# Patient Record
Sex: FEMALE | Race: WHITE | NOT HISPANIC OR LATINO | Employment: OTHER | URBAN - METROPOLITAN AREA
[De-identification: names, ages, dates, MRNs, and addresses within clinical notes are randomized per-mention and may not be internally consistent; named-entity substitution may affect disease eponyms.]

---

## 2017-08-07 ENCOUNTER — HOSPITAL ENCOUNTER (OUTPATIENT)
Dept: MAMMOGRAPHY | Facility: CLINIC | Age: 62
Discharge: HOME | End: 2017-08-07
Attending: FAMILY MEDICINE | Admitting: FAMILY MEDICINE
Payer: COMMERCIAL

## 2017-08-07 DIAGNOSIS — Z12.31 VISIT FOR SCREENING MAMMOGRAM: ICD-10-CM

## 2017-08-07 PROCEDURE — G0202 SCR MAMMO BI INCL CAD: HCPCS

## 2018-09-06 ENCOUNTER — HOSPITAL ENCOUNTER (OUTPATIENT)
Dept: MAMMOGRAPHY | Facility: CLINIC | Age: 63
Discharge: HOME | End: 2018-09-06
Attending: FAMILY MEDICINE | Admitting: FAMILY MEDICINE
Payer: MEDICARE

## 2018-09-06 DIAGNOSIS — Z12.31 VISIT FOR SCREENING MAMMOGRAM: ICD-10-CM

## 2018-09-06 PROCEDURE — 77063 BREAST TOMOSYNTHESIS BI: CPT

## 2019-09-16 ENCOUNTER — HOSPITAL ENCOUNTER (OUTPATIENT)
Dept: MAMMOGRAPHY | Facility: CLINIC | Age: 64
Discharge: HOME | End: 2019-09-16
Attending: FAMILY MEDICINE | Admitting: FAMILY MEDICINE
Payer: COMMERCIAL

## 2019-09-16 DIAGNOSIS — Z12.31 VISIT FOR SCREENING MAMMOGRAM: ICD-10-CM

## 2019-09-16 PROCEDURE — 77067 SCR MAMMO BI INCL CAD: CPT

## 2020-01-16 ENCOUNTER — THERAPY VISIT (OUTPATIENT)
Dept: PHYSICAL THERAPY | Facility: CLINIC | Age: 65
End: 2020-01-16
Payer: COMMERCIAL

## 2020-01-16 DIAGNOSIS — M79.671 PAIN OF RIGHT HEEL: ICD-10-CM

## 2020-01-16 DIAGNOSIS — M25.561 RIGHT KNEE PAIN: ICD-10-CM

## 2020-01-16 PROCEDURE — 97140 MANUAL THERAPY 1/> REGIONS: CPT | Mod: GP | Performed by: PHYSICAL THERAPIST

## 2020-01-16 PROCEDURE — 97161 PT EVAL LOW COMPLEX 20 MIN: CPT | Mod: GP | Performed by: PHYSICAL THERAPIST

## 2020-01-16 PROCEDURE — 97110 THERAPEUTIC EXERCISES: CPT | Mod: GP | Performed by: PHYSICAL THERAPIST

## 2020-01-16 ASSESSMENT — ACTIVITIES OF DAILY LIVING (ADL)
WALK: ACTIVITY IS MINIMALLY DIFFICULT
STIFFNESS: I HAVE THE SYMPTOM BUT IT DOES NOT AFFECT MY ACTIVITY
SIT WITH YOUR KNEE BENT: ACTIVITY IS MINIMALLY DIFFICULT
PAIN: I HAVE THE SYMPTOM BUT IT DOES NOT AFFECT MY ACTIVITY
GO DOWN STAIRS: ACTIVITY IS SOMEWHAT DIFFICULT
KNEEL ON THE FRONT OF YOUR KNEE: ACTIVITY IS SOMEWHAT DIFFICULT
RISE FROM A CHAIR: ACTIVITY IS MINIMALLY DIFFICULT
GO UP STAIRS: ACTIVITY IS SOMEWHAT DIFFICULT
HOW_WOULD_YOU_RATE_THE_OVERALL_FUNCTION_OF_YOUR_KNEE_DURING_YOUR_USUAL_DAILY_ACTIVITIES?: NEARLY NORMAL
WEAKNESS: I HAVE THE SYMPTOM BUT IT DOES NOT AFFECT MY ACTIVITY
HOW_WOULD_YOU_RATE_THE_CURRENT_FUNCTION_OF_YOUR_KNEE_DURING_YOUR_USUAL_DAILY_ACTIVITIES_ON_A_SCALE_FROM_0_TO_100_WITH_100_BEING_YOUR_LEVEL_OF_KNEE_FUNCTION_PRIOR_TO_YOUR_INJURY_AND_0_BEING_THE_INABILITY_TO_PERFORM_ANY_OF_YOUR_USUAL_DAILY_ACTIVITIES?: 90
KNEE_ACTIVITY_OF_DAILY_LIVING_SUM: 51
AS_A_RESULT_OF_YOUR_KNEE_INJURY,_HOW_WOULD_YOU_RATE_YOUR_CURRENT_LEVEL_OF_DAILY_ACTIVITY?: NEARLY NORMAL
LIMPING: THE SYMPTOM AFFECTS MY ACTIVITY MODERATELY
RAW_SCORE: 51
STAND: ACTIVITY IS MINIMALLY DIFFICULT
KNEE_ACTIVITY_OF_DAILY_LIVING_SCORE: 72.86
SWELLING: THE SYMPTOM AFFECTS MY ACTIVITY SLIGHTLY
GIVING WAY, BUCKLING OR SHIFTING OF KNEE: I DO NOT HAVE THE SYMPTOM
SQUAT: ACTIVITY IS MINIMALLY DIFFICULT

## 2020-01-16 NOTE — PROGRESS NOTES
[facility] Initial Evaluation  Subjective:  The history is provided by the patient. No  was used.   Type of problem:  Right knee    This is a new condition   Problem details: Was sick with pneumonia since November so has had to be sedentary.  Had onset of knee pain on the right with walking down steps.   Saw MD on 1/6/2020 and received a cortisone injection.  This has helped.  Now has less pain with walking.  Also has plantar fasciitis.  This has been an issue in the past and also got worse when she was sick. Pain is only on the right side.  Was able to do yoga and light aerobics class this week but had some knee and foot pain.   Goal is to get back 2-3 times a week.   .   Patient reports pain:  Posterior.  Associated symptoms:  Edema, loss of motion/stiffness and loss of strength. Symptoms are exacerbated by kneeling, sitting, walking, descending stairs and ascending stairs Relieved by: cortisone.    Type of problem:  Right ankle   Condition occurred with:  Insidious onset. This is a recurrent condition    Site of Pain: plantar. Radiates to:  No radiation. Associated symptoms:  Loss of motion/stiffness. Symptoms are exacerbated by weight bearing and relieved by rest.                      Objective:    Gait:      Deviations:  Ankle:  Pronation incr L, pronation incr R and push off decr RGeneral Deviations:  Toe out L and toe out R          Ankle/Foot Evaluation  ROM:    AROM:    Dorsiflexion:  Left:   5  Right:   10          Great Toe Extension: Left:      Right: 30 due to previous surgery    Strength:                        Gastroc/Soleus:Left: 4+/5   Weak/pain free  Pain:  Right:  5/5  Strong/painful  Pain:++  LIGAMENT TESTING: not assessed              SPECIAL TESTS: not assessed    PALPATION: Palpation of ankle: and lateral calcaneal.    Right ankle tenderness present at:   plantar fascia and medial calcaneal  EDEMA: normal          MOBILITY TESTING: Mobility testing  ankle: previous bunion surgery on the right, left Total ankle arthroplasty.            First Ray Right: hypomobile  FUNCTIONAL TESTS:           Proprioception:  Stork Balance Test: Right: painful and difficult                                               Knee Evaluation:  ROM:      PROM    Hyperextension: Left: 2    Right:  1    Flexion: Left: 125    Right:  120 with mild end range pain      Strength:     Extension:  Left: 5-/5   Strong/pain free  Pain:      Right: 5-/5   Strong/painful  Pain:    Quad Set Left: Good    Pain:   Quad Set Right: Good    Pain:  Ligament Testing:  Not Assessed                Special Tests: Special test for knee: Pain with step, 4 inches.    Right knee positive for the following tests:  Asterisk Sign  Palpation:      Right knee tenderness present at:  Popliteal  Edema:  Edema of the knee: moderate suprapatellar and posterior knee on the right.    Mobility Testing:  Normal            Functional Testing:  not assessed                  General     ROS    Assessment/Plan:    Patient is a 64 year old female with right side knee and right side ankle complaints.    Patient has the following significant findings with corresponding treatment plan.                Diagnosis 1:  Right knee pain  Pain -  hot/cold therapy, manual therapy, self management, education and home program  Decreased ROM/flexibility - manual therapy, therapeutic exercise and home program  Decreased strength - therapeutic exercise, therapeutic activities and home program  Decreased function - therapeutic activities and home program  Diagnosis 2:  Right plantar pain   Pain -  hot/cold therapy, manual therapy, splint/taping/bracing/orthotics, self management, education and home program  Decreased ROM/flexibility - manual therapy, therapeutic exercise and home program  Decreased strength - therapeutic exercise, therapeutic activities and home program  Decreased function - therapeutic activities and home program    Therapy  Evaluation Codes:   1) History comprised of:   Personal factors that impact the plan of care:      Time since onset of symptoms.    Comorbidity factors that impact the plan of care are:      Overweight.     Medications impacting care: None.  2) Examination of Body Systems comprised of:   Body structures and functions that impact the plan of care:      Ankle and Knee.   Activity limitations that impact the plan of care are:      Sports, Squatting/kneeling, Stairs and Walking.  3) Clinical presentation characteristics are:   Stable/Uncomplicated.  4) Decision-Making    Low complexity using standardized patient assessment instrument and/or measureable assessment of functional outcome.  Cumulative Therapy Evaluation is: Low complexity.    Previous and current functional limitations:  (See Goal Flow Sheet for this information)    Short term and Long term goals: (See Goal Flow Sheet for this information)     Communication ability:  Patient appears to be able to clearly communicate and understand verbal and written communication and follow directions correctly.  Treatment Explanation - The following has been discussed with the patient:   RX ordered/plan of care  Anticipated outcomes  Possible risks and side effects  This patient would benefit from PT intervention to resume normal activities.   Rehab potential is good.    Frequency:  1 X week, once daily  Duration:  for 8 weeks  Discharge Plan:  Achieve all LTG.  Independent in home treatment program.  Reach maximal therapeutic benefit.    Please refer to the daily flowsheet for treatment today, total treatment time and time spent performing 1:1 timed codes.

## 2020-01-16 NOTE — LETTER
[facility] PHYSICAL [facility]  [address]  [place] [id number] [phone]    2020    Re: Elizabeth Cuellar   :   1955  MRN:  [id number]   REFERRING PHYSICIAN:   Adrianna Galan    [facility]IC [facility]    Date of Initial Evaluation:  2020    Visits:  Rxs Used: 1  Reason for Referral:     Right knee pain  Pain of right heel    EVALUATION SUMMARY    [facility] Initial Evaluation  Subjective:  The history is provided by the patient. No  was used.   Type of problem:  Right knee    This is a new condition   Problem details: Was sick with pneumonia since November so has had to be sedentary.  Had onset of knee pain on the right with walking down steps.   Saw MD on 2020 and received a cortisone injection.  This has helped.  Now has less pain with walking.  Also has plantar fasciitis.  This has been an issue in the past and also got worse when she was sick. Pain is only on the right side.  Was able to do yoga and light aerobics class this week but had some knee and foot pain.   Goal is to get back 2-3 times a week.   .   Patient reports pain:  Posterior.  Associated symptoms:  Edema, loss of motion/stiffness and loss of strength. Symptoms are exacerbated by kneeling, sitting, walking, descending stairs and ascending stairs Relieved by: cortisone.  Type of problem:  Right ankle   Condition occurred with:  Insidious onset. This is a recurrent condition    Site of Pain: plantar. Radiates to:  No radiation. Associated symptoms:  Loss of motion/stiffness. Symptoms are exacerbated by weight bearing and relieved by rest.  Pertinent medical history includes:  Overweight, menopausal and depression.      Current medications:  None.                 Barriers include:  None as reported by patient.  Red flags:  None as reported by patient.  Knee Activity of Daily Living Score: 72.86           Objective:  Gait:    Deviations:  Ankle:  Pronation incr L, pronation incr R and push off decr RGeneral Deviations:  Toe out L and toe out R  Ankle/Foot Evaluation  ROM:    AROM:    Dorsiflexion:  Left:   5  Right:   10  Great Toe Extension: Left:      Right: 30 due to previous surgery    Strength:    Gastroc/Soleus:Left: 4+/5   Weak/pain free  Pain:  Right:  5/5  Strong/painful  Pain:++  LIGAMENT TESTING: not assessed  SPECIAL TESTS: not assessed  PALPATION: Palpation of ankle: and lateral calcaneal.  Right ankle tenderness present at:   plantar fascia and medial calcaneal  EDEMA: normal  MOBILITY TESTING: Mobility testing ankle: previous bunion surgery on the right, left Total ankle arthroplasty.  First Ray Right: hypomobile  FUNCTIONAL TESTS:   Proprioception:  Stork Balance Test: Right: painful and difficult   Knee Evaluation:  ROM:    PROM  Hyperextension: Left: 2    Right:  1  Flexion: Left: 125    Right:  120 with mild end range pain  Strength:   Extension:  Left: 5-/5   Strong/pain free  Pain:      Right: 5-/5   Strong/painful  Pain:  Quad Set Left: Good    Pain:   Quad Set Right: Good    Pain:  Ligament Testing:  Not Assessed  Special Tests: Special test for knee: Pain with step, 4 inches.  Right knee positive for the following tests:  Asterisk Sign  Palpation:    Right knee tenderness present at:  Popliteal  Edema:  Edema of the knee: moderate suprapatellar and posterior knee on the right.  Mobility Testing:  Normal  Functional Testing:  not assessed  Assessment/Plan:    Patient is a 64 year old female with right side knee and right side ankle complaints.    Patient has the following significant findings with corresponding treatment plan.                Diagnosis 1:  Right knee pain  Pain -  hot/cold therapy, manual therapy, self management, education and home program  Decreased ROM/flexibility - manual therapy, therapeutic exercise and home program  Decreased strength - therapeutic exercise,  therapeutic activities and home program  Decreased function - therapeutic activities and home program  Diagnosis 2:  Right plantar pain   Pain -  hot/cold therapy, manual therapy, splint/taping/bracing/orthotics, self management, education and home program  Decreased ROM/flexibility - manual therapy, therapeutic exercise and home program  Decreased strength - therapeutic exercise, therapeutic activities and home program  Decreased function - therapeutic activities and home program  Therapy Evaluation Codes:   1) History comprised of:   Personal factors that impact the plan of care:      Time since onset of symptoms.    Comorbidity factors that impact the plan of care are:      Overweight.     Medications impacting care: None.  2) Examination of Body Systems comprised of:   Body structures and functions that impact the plan of care:      Ankle and Knee.   Activity limitations that impact the plan of care are:      Sports, Squatting/kneeling, Stairs and Walking.  3) Clinical presentation characteristics are:   Stable/Uncomplicated.  4) Decision-Making    Low complexity using standardized patient assessment instrument and/or measureable assessment of functional outcome.  Cumulative Therapy Evaluation is: Low complexity.  Previous and current functional limitations:  (See Goal Flow Sheet for this information)    Short term and Long term goals: (See Goal Flow Sheet for this information)   Communication ability:  Patient appears to be able to clearly communicate and understand verbal and written communication and follow directions correctly.  Treatment Explanation - The following has been discussed with the patient:   RX ordered/plan of care  Anticipated outcomes  Possible risks and side effects  This patient would benefit from PT intervention to resume normal activities.   Rehab potential is good.    Frequency:  1 X week, once daily  Duration:  for 8 weeks  Discharge Plan:  Achieve all LTG.  Independent in home treatment  program.  Reach maximal therapeutic benefit.      Thank you for your referral.    INQUIRIES  Therapist: Adia Magana PT, [facility] - [facility] PHYSICAL THERAPY  [facility]  [place] [address]  Phone: [phone]  Fax: [phone]

## 2020-01-17 NOTE — PROGRESS NOTES
[facility] Initial Evaluation  Subjective:       Pertinent medical history includes:  Overweight, menopausal and depression.      Current medications:  None.                 Barriers include:  None as reported by patient.  Red flags:  None as reported by patient.         Knee Activity of Daily Living Score: 72.86            Objective:  System    Physical Exam    General     ROS    Assessment/Plan:

## 2020-01-23 ENCOUNTER — THERAPY VISIT (OUTPATIENT)
Dept: PHYSICAL THERAPY | Facility: CLINIC | Age: 65
End: 2020-01-23
Payer: COMMERCIAL

## 2020-01-23 DIAGNOSIS — M79.671 PAIN OF RIGHT HEEL: ICD-10-CM

## 2020-01-23 DIAGNOSIS — M25.561 RIGHT KNEE PAIN: ICD-10-CM

## 2020-01-23 PROCEDURE — 97140 MANUAL THERAPY 1/> REGIONS: CPT | Mod: GP | Performed by: PHYSICAL THERAPIST

## 2020-01-23 PROCEDURE — 97110 THERAPEUTIC EXERCISES: CPT | Mod: GP | Performed by: PHYSICAL THERAPIST

## 2020-01-29 ENCOUNTER — THERAPY VISIT (OUTPATIENT)
Dept: PHYSICAL THERAPY | Facility: CLINIC | Age: 65
End: 2020-01-29
Payer: COMMERCIAL

## 2020-01-29 DIAGNOSIS — M79.671 PAIN OF RIGHT HEEL: ICD-10-CM

## 2020-01-29 DIAGNOSIS — M25.561 RIGHT KNEE PAIN: ICD-10-CM

## 2020-01-29 PROCEDURE — 97110 THERAPEUTIC EXERCISES: CPT | Mod: GP | Performed by: PHYSICAL THERAPIST

## 2020-01-29 PROCEDURE — 97140 MANUAL THERAPY 1/> REGIONS: CPT | Mod: GP | Performed by: PHYSICAL THERAPIST

## 2020-02-06 ENCOUNTER — THERAPY VISIT (OUTPATIENT)
Dept: PHYSICAL THERAPY | Facility: CLINIC | Age: 65
End: 2020-02-06
Payer: COMMERCIAL

## 2020-02-06 DIAGNOSIS — M79.671 PAIN OF RIGHT HEEL: ICD-10-CM

## 2020-02-06 DIAGNOSIS — M25.561 RIGHT KNEE PAIN: ICD-10-CM

## 2020-02-06 PROCEDURE — 97140 MANUAL THERAPY 1/> REGIONS: CPT | Mod: GP | Performed by: PHYSICAL THERAPIST

## 2020-02-06 PROCEDURE — 97110 THERAPEUTIC EXERCISES: CPT | Mod: GP | Performed by: PHYSICAL THERAPIST

## 2020-02-06 ASSESSMENT — ACTIVITIES OF DAILY LIVING (ADL)
STIFFNESS: I DO NOT HAVE THE SYMPTOM
STAND: ACTIVITY IS NOT DIFFICULT
AS_A_RESULT_OF_YOUR_KNEE_INJURY,_HOW_WOULD_YOU_RATE_YOUR_CURRENT_LEVEL_OF_DAILY_ACTIVITY?: NORMAL
RISE FROM A CHAIR: ACTIVITY IS NOT DIFFICULT
SWELLING: I HAVE THE SYMPTOM BUT IT DOES NOT AFFECT MY ACTIVITY
PAIN: I DO NOT HAVE THE SYMPTOM
RAW_SCORE: 69
WEAKNESS: I DO NOT HAVE THE SYMPTOM
KNEE_ACTIVITY_OF_DAILY_LIVING_SUM: 69
GO DOWN STAIRS: ACTIVITY IS NOT DIFFICULT
HOW_WOULD_YOU_RATE_THE_CURRENT_FUNCTION_OF_YOUR_KNEE_DURING_YOUR_USUAL_DAILY_ACTIVITIES_ON_A_SCALE_FROM_0_TO_100_WITH_100_BEING_YOUR_LEVEL_OF_KNEE_FUNCTION_PRIOR_TO_YOUR_INJURY_AND_0_BEING_THE_INABILITY_TO_PERFORM_ANY_OF_YOUR_USUAL_DAILY_ACTIVITIES?: 95
KNEEL ON THE FRONT OF YOUR KNEE: ACTIVITY IS NOT DIFFICULT
GO UP STAIRS: ACTIVITY IS NOT DIFFICULT
LIMPING: I DO NOT HAVE THE SYMPTOM
SQUAT: ACTIVITY IS NOT DIFFICULT
KNEE_ACTIVITY_OF_DAILY_LIVING_SCORE: 98.57
WALK: ACTIVITY IS NOT DIFFICULT
GIVING WAY, BUCKLING OR SHIFTING OF KNEE: I DO NOT HAVE THE SYMPTOM
SIT WITH YOUR KNEE BENT: ACTIVITY IS NOT DIFFICULT
HOW_WOULD_YOU_RATE_THE_OVERALL_FUNCTION_OF_YOUR_KNEE_DURING_YOUR_USUAL_DAILY_ACTIVITIES?: NORMAL

## 2020-02-06 NOTE — LETTER
[facility] ATHLETIC [facility]  [address]  [place] [id number] [phone]    2020    Re: Elizabeth Cuellar   :   1955  MRN:  [id number]   REFERRING PHYSICIAN:   Adrianna Galan    [facility]    Date of Initial Evaluation:  2020  Visits:  Rxs Used: 4  Reason for Referral:     Right knee pain  Pain of right heel    Assessment/Plan:    DISCHARGE REPORT    Progress reporting period is from 2020 to 2020.       SUBJECTIVE  Subjective changes noted by patient:  .  Subjective: KNee is just fine.  Foot feels better but still an issue    Current pain level is NA  .     Previous pain level was   Initial Pain level: 5/10.   Changes in function:  Yes (See Goal flowsheet attached for changes in current functional level)  Adverse reaction to treatment or activity: None    OBJECTIVE  Changes noted in objective findings:  Yes,   Objective: No swelling and pain free ROM in the knee.  No knee pain with moving sit to stand.     ASSESSMENT/PLAN  Updated problem list and treatment plan: Diagnosis 1:  Knee pain  Pain -  manual therapy, self management and education  Decreased ROM/flexibility - manual therapy, therapeutic exercise and home program  Decreased strength - therapeutic exercise, therapeutic activities and home program  Inflammation - self management/home program  Diagnosis 2:  Foot pain   Pain -  hot/cold therapy, manual therapy, splint/taping/bracing/orthotics, self management, education and home program  Decreased ROM/flexibility - manual therapy, therapeutic exercise and home program  Decreased strength - therapeutic exercise, therapeutic activities and home program  Inflammation - self management/home program  Decreased function - therapeutic activities and home program  STG/LTGs have been met or progress has been made towards goals:  Yes (See Goal flow sheet completed today.)  Assessment of  Progress: The patient's condition is improving.  The patient's condition has potential to improve.  Self Management Plans:  Patient has been instructed in a home treatment program.  Elizabeth continues to require the following intervention to meet STG and LTG's:  Patient needs to continue to work on the home exercise program.      Recommendations:  This patient is ready to be discharged from therapy and continue their home treatment program.        Thank you for your referral.    INQUIRIES  Therapist:Adia Magana PT, [facility] PHYSICAL THERAPY  [address] [place] [address] [id number]  [facility] [address]  Phone: [phone]  Fax: [phone]

## 2020-02-07 ENCOUNTER — HOSPITAL ENCOUNTER (OUTPATIENT)
Facility: CLINIC | Age: 65
End: 2020-02-07
Attending: OBSTETRICS & GYNECOLOGY | Admitting: OBSTETRICS & GYNECOLOGY
Payer: COMMERCIAL

## 2020-02-07 RX ORDER — ACETAMINOPHEN 325 MG/1
975 TABLET ORAL ONCE
Status: CANCELLED | OUTPATIENT
Start: 2020-02-07 | End: 2020-02-07

## 2020-02-26 ENCOUNTER — HOSPITAL ENCOUNTER (OUTPATIENT)
Facility: CLINIC | Age: 65
End: 2020-02-26
Attending: OBSTETRICS & GYNECOLOGY | Admitting: OBSTETRICS & GYNECOLOGY
Payer: COMMERCIAL

## 2020-03-12 PROBLEM — M25.561 RIGHT KNEE PAIN: Status: RESOLVED | Noted: 2020-01-16 | Resolved: 2020-03-12

## 2020-03-12 PROBLEM — M79.671 PAIN OF RIGHT HEEL: Status: RESOLVED | Noted: 2020-01-16 | Resolved: 2020-03-12

## 2020-03-13 NOTE — PROGRESS NOTES
Subjective:  HPI  Physical Exam       Knee Activity of Daily Living Score: 98.57            Objective:  System    Physical Exam    General     ROS    Assessment/Plan:    DISCHARGE REPORT    Progress reporting period is from 1/16/2020 to 2/6/2020.       SUBJECTIVE  Subjective changes noted by patient:  .  Subjective: KNee is just fine.  Foot feels better but still an issue    Current pain level is NA  .     Previous pain level was   Initial Pain level: 5/10.   Changes in function:  Yes (See Goal flowsheet attached for changes in current functional level)  Adverse reaction to treatment or activity: None    OBJECTIVE  Changes noted in objective findings:  Yes,   Objective: No swelling and pain free ROM in the knee.  No knee pain with moving sit to stand.     ASSESSMENT/PLAN  Updated problem list and treatment plan: Diagnosis 1:  Knee pain  Pain -  manual therapy, self management and education  Decreased ROM/flexibility - manual therapy, therapeutic exercise and home program  Decreased strength - therapeutic exercise, therapeutic activities and home program  Inflammation - self management/home program  Diagnosis 2:  Foot pain   Pain -  hot/cold therapy, manual therapy, splint/taping/bracing/orthotics, self management, education and home program  Decreased ROM/flexibility - manual therapy, therapeutic exercise and home program  Decreased strength - therapeutic exercise, therapeutic activities and home program  Inflammation - self management/home program  Decreased function - therapeutic activities and home program  STG/LTGs have been met or progress has been made towards goals:  Yes (See Goal flow sheet completed today.)  Assessment of Progress: The patient's condition is improving.  The patient's condition has potential to improve.  Self Management Plans:  Patient has been instructed in a home treatment program.    Elizabeth continues to require the following intervention to meet STG and LTG's:  Patient needs to continue to  work on the home exercise program.      Recommendations:  This patient is ready to be discharged from therapy and continue their home treatment program.    Please refer to the daily flowsheet for treatment today, total treatment time and time spent performing 1:1 timed codes.

## 2020-05-18 DIAGNOSIS — Z11.59 ENCOUNTER FOR SCREENING FOR OTHER VIRAL DISEASES: Primary | ICD-10-CM

## 2020-05-26 DIAGNOSIS — Z11.59 ENCOUNTER FOR SCREENING FOR OTHER VIRAL DISEASES: ICD-10-CM

## 2020-05-26 PROCEDURE — 87635 SARS-COV-2 COVID-19 AMP PRB: CPT | Mod: 90 | Performed by: OBSTETRICS & GYNECOLOGY

## 2020-05-26 PROCEDURE — 99000 SPECIMEN HANDLING OFFICE-LAB: CPT | Performed by: OBSTETRICS & GYNECOLOGY

## 2020-05-27 PROBLEM — N95.0 POSTMENOPAUSAL BLEEDING: Status: ACTIVE | Noted: 2020-05-27

## 2020-05-27 LAB
SARS-COV-2 RNA SPEC QL NAA+PROBE: NOT DETECTED
SPECIMEN SOURCE: NORMAL

## 2020-05-27 RX ORDER — GLUCOSAM/CHONDRO/HERB 149/HYAL 750-100 MG
TABLET ORAL
COMMUNITY

## 2020-05-27 RX ORDER — LORATADINE 10 MG/1
10 TABLET ORAL DAILY PRN
COMMUNITY

## 2020-05-27 RX ORDER — CEPHALEXIN 500 MG/1
2000 CAPSULE ORAL
COMMUNITY

## 2020-05-27 RX ORDER — OXYBUTYNIN CHLORIDE 5 MG/1
5 TABLET, EXTENDED RELEASE ORAL DAILY
COMMUNITY
End: 2024-02-29

## 2020-05-27 RX ORDER — FLUTICASONE PROPIONATE 50 MCG
1 SPRAY, SUSPENSION (ML) NASAL DAILY
COMMUNITY

## 2020-05-27 RX ORDER — LEVOTHYROXINE SODIUM 125 UG/1
125 TABLET ORAL DAILY
COMMUNITY

## 2020-05-27 RX ORDER — ROSUVASTATIN CALCIUM 5 MG/1
5 TABLET, COATED ORAL DAILY
COMMUNITY

## 2020-05-27 RX ORDER — CYANOCOBALAMIN 1000 UG/ML
1 INJECTION, SOLUTION INTRAMUSCULAR; SUBCUTANEOUS
COMMUNITY

## 2020-05-28 ENCOUNTER — ANESTHESIA EVENT (OUTPATIENT)
Dept: SURGERY | Facility: CLINIC | Age: 65
End: 2020-05-28
Payer: COMMERCIAL

## 2020-05-28 ENCOUNTER — ANESTHESIA (OUTPATIENT)
Dept: SURGERY | Facility: CLINIC | Age: 65
End: 2020-05-28
Payer: COMMERCIAL

## 2020-05-28 ENCOUNTER — HOSPITAL ENCOUNTER (OUTPATIENT)
Facility: CLINIC | Age: 65
Discharge: HOME | End: 2020-05-28
Attending: OBSTETRICS & GYNECOLOGY | Admitting: OBSTETRICS & GYNECOLOGY
Payer: COMMERCIAL

## 2020-05-28 VITALS
DIASTOLIC BLOOD PRESSURE: 73 MMHG | WEIGHT: 181.6 LBS | SYSTOLIC BLOOD PRESSURE: 136 MMHG | TEMPERATURE: 97.2 F | OXYGEN SATURATION: 99 % | RESPIRATION RATE: 10 BRPM | HEART RATE: 50 BPM | HEIGHT: 66 IN | BODY MASS INDEX: 29.18 KG/M2

## 2020-05-28 DIAGNOSIS — N95.0 POSTMENOPAUSAL BLEEDING: Primary | ICD-10-CM

## 2020-05-28 LAB — HGB BLD-MCNC: 12 G/DL (ref 11.7–15.7)

## 2020-05-28 PROCEDURE — 88305 TISSUE EXAM BY PATHOLOGIST: CPT | Performed by: OBSTETRICS & GYNECOLOGY

## 2020-05-28 PROCEDURE — 25000125 ZZHC RX 250: Performed by: OBSTETRICS & GYNECOLOGY

## 2020-05-28 PROCEDURE — 85018 HEMOGLOBIN: CPT | Performed by: OBSTETRICS & GYNECOLOGY

## 2020-05-28 PROCEDURE — 40000170 ZZH STATISTIC PRE-PROCEDURE ASSESSMENT II: Performed by: OBSTETRICS & GYNECOLOGY

## 2020-05-28 PROCEDURE — 25000128 H RX IP 250 OP 636: Performed by: NURSE ANESTHETIST, CERTIFIED REGISTERED

## 2020-05-28 PROCEDURE — 27210794 ZZH OR GENERAL SUPPLY STERILE: Performed by: OBSTETRICS & GYNECOLOGY

## 2020-05-28 PROCEDURE — 25800025 ZZH RX 258: Performed by: OBSTETRICS & GYNECOLOGY

## 2020-05-28 PROCEDURE — 71000027 ZZH RECOVERY PHASE 2 EACH 15 MINS: Performed by: OBSTETRICS & GYNECOLOGY

## 2020-05-28 PROCEDURE — 36415 COLL VENOUS BLD VENIPUNCTURE: CPT | Performed by: OBSTETRICS & GYNECOLOGY

## 2020-05-28 PROCEDURE — 25000132 ZZH RX MED GY IP 250 OP 250 PS 637: Performed by: OBSTETRICS & GYNECOLOGY

## 2020-05-28 PROCEDURE — 25000125 ZZHC RX 250: Performed by: NURSE ANESTHETIST, CERTIFIED REGISTERED

## 2020-05-28 PROCEDURE — 36000058 ZZH SURGERY LEVEL 3 EA 15 ADDTL MIN: Performed by: OBSTETRICS & GYNECOLOGY

## 2020-05-28 PROCEDURE — 88305 TISSUE EXAM BY PATHOLOGIST: CPT | Mod: 26 | Performed by: OBSTETRICS & GYNECOLOGY

## 2020-05-28 PROCEDURE — 37000009 ZZH ANESTHESIA TECHNICAL FEE, EACH ADDTL 15 MIN: Performed by: OBSTETRICS & GYNECOLOGY

## 2020-05-28 PROCEDURE — 36000056 ZZH SURGERY LEVEL 3 1ST 30 MIN: Performed by: OBSTETRICS & GYNECOLOGY

## 2020-05-28 PROCEDURE — 25800030 ZZH RX IP 258 OP 636: Performed by: NURSE ANESTHETIST, CERTIFIED REGISTERED

## 2020-05-28 PROCEDURE — 25000566 ZZH SEVOFLURANE, EA 15 MIN: Performed by: OBSTETRICS & GYNECOLOGY

## 2020-05-28 PROCEDURE — 37000008 ZZH ANESTHESIA TECHNICAL FEE, 1ST 30 MIN: Performed by: OBSTETRICS & GYNECOLOGY

## 2020-05-28 PROCEDURE — 71000012 ZZH RECOVERY PHASE 1 LEVEL 1 FIRST HR: Performed by: OBSTETRICS & GYNECOLOGY

## 2020-05-28 RX ORDER — ONDANSETRON 2 MG/ML
INJECTION INTRAMUSCULAR; INTRAVENOUS PRN
Status: DISCONTINUED | OUTPATIENT
Start: 2020-05-28 | End: 2020-05-28

## 2020-05-28 RX ORDER — PROPOFOL 10 MG/ML
INJECTION, EMULSION INTRAVENOUS PRN
Status: DISCONTINUED | OUTPATIENT
Start: 2020-05-28 | End: 2020-05-28

## 2020-05-28 RX ORDER — ACETAMINOPHEN 325 MG/1
650 TABLET ORAL EVERY 4 HOURS PRN
Qty: 50 TABLET | Refills: 0 | Status: SHIPPED | OUTPATIENT
Start: 2020-05-28 | End: 2024-02-29

## 2020-05-28 RX ORDER — PROPOFOL 10 MG/ML
INJECTION, EMULSION INTRAVENOUS CONTINUOUS PRN
Status: DISCONTINUED | OUTPATIENT
Start: 2020-05-28 | End: 2020-05-28

## 2020-05-28 RX ORDER — DEXAMETHASONE SODIUM PHOSPHATE 4 MG/ML
INJECTION, SOLUTION INTRA-ARTICULAR; INTRALESIONAL; INTRAMUSCULAR; INTRAVENOUS; SOFT TISSUE PRN
Status: DISCONTINUED | OUTPATIENT
Start: 2020-05-28 | End: 2020-05-28

## 2020-05-28 RX ORDER — MAGNESIUM HYDROXIDE 1200 MG/15ML
LIQUID ORAL PRN
Status: DISCONTINUED | OUTPATIENT
Start: 2020-05-28 | End: 2020-05-28 | Stop reason: HOSPADM

## 2020-05-28 RX ORDER — ONDANSETRON 4 MG/1
4 TABLET, ORALLY DISINTEGRATING ORAL EVERY 30 MIN PRN
Status: DISCONTINUED | OUTPATIENT
Start: 2020-05-28 | End: 2020-05-28 | Stop reason: HOSPADM

## 2020-05-28 RX ORDER — HYDROMORPHONE HYDROCHLORIDE 1 MG/ML
.3-.5 INJECTION, SOLUTION INTRAMUSCULAR; INTRAVENOUS; SUBCUTANEOUS EVERY 5 MIN PRN
Status: DISCONTINUED | OUTPATIENT
Start: 2020-05-28 | End: 2020-05-28 | Stop reason: HOSPADM

## 2020-05-28 RX ORDER — LIDOCAINE HYDROCHLORIDE 20 MG/ML
INJECTION, SOLUTION INFILTRATION; PERINEURAL PRN
Status: DISCONTINUED | OUTPATIENT
Start: 2020-05-28 | End: 2020-05-28

## 2020-05-28 RX ORDER — FENTANYL CITRATE 50 UG/ML
INJECTION, SOLUTION INTRAMUSCULAR; INTRAVENOUS PRN
Status: DISCONTINUED | OUTPATIENT
Start: 2020-05-28 | End: 2020-05-28

## 2020-05-28 RX ORDER — KETOROLAC TROMETHAMINE 30 MG/ML
INJECTION, SOLUTION INTRAMUSCULAR; INTRAVENOUS PRN
Status: DISCONTINUED | OUTPATIENT
Start: 2020-05-28 | End: 2020-05-28

## 2020-05-28 RX ORDER — SODIUM CHLORIDE, SODIUM LACTATE, POTASSIUM CHLORIDE, CALCIUM CHLORIDE 600; 310; 30; 20 MG/100ML; MG/100ML; MG/100ML; MG/100ML
INJECTION, SOLUTION INTRAVENOUS CONTINUOUS
Status: DISCONTINUED | OUTPATIENT
Start: 2020-05-28 | End: 2020-05-28 | Stop reason: HOSPADM

## 2020-05-28 RX ORDER — ACETAMINOPHEN 325 MG/1
975 TABLET ORAL ONCE
Status: COMPLETED | OUTPATIENT
Start: 2020-05-28 | End: 2020-05-28

## 2020-05-28 RX ORDER — NALOXONE HYDROCHLORIDE 0.4 MG/ML
.1-.4 INJECTION, SOLUTION INTRAMUSCULAR; INTRAVENOUS; SUBCUTANEOUS
Status: DISCONTINUED | OUTPATIENT
Start: 2020-05-28 | End: 2020-05-28 | Stop reason: HOSPADM

## 2020-05-28 RX ORDER — SODIUM CHLORIDE, SODIUM LACTATE, POTASSIUM CHLORIDE, CALCIUM CHLORIDE 600; 310; 30; 20 MG/100ML; MG/100ML; MG/100ML; MG/100ML
INJECTION, SOLUTION INTRAVENOUS CONTINUOUS PRN
Status: DISCONTINUED | OUTPATIENT
Start: 2020-05-28 | End: 2020-05-28

## 2020-05-28 RX ORDER — ONDANSETRON 2 MG/ML
4 INJECTION INTRAMUSCULAR; INTRAVENOUS EVERY 30 MIN PRN
Status: DISCONTINUED | OUTPATIENT
Start: 2020-05-28 | End: 2020-05-28 | Stop reason: HOSPADM

## 2020-05-28 RX ORDER — IBUPROFEN 400 MG/1
400 TABLET, FILM COATED ORAL EVERY 6 HOURS PRN
Qty: 30 TABLET | Refills: 0 | Status: SHIPPED | OUTPATIENT
Start: 2020-05-28 | End: 2024-02-29

## 2020-05-28 RX ORDER — FENTANYL CITRATE 50 UG/ML
25-50 INJECTION, SOLUTION INTRAMUSCULAR; INTRAVENOUS
Status: DISCONTINUED | OUTPATIENT
Start: 2020-05-28 | End: 2020-05-28 | Stop reason: HOSPADM

## 2020-05-28 RX ORDER — EPHEDRINE SULFATE 50 MG/ML
INJECTION, SOLUTION INTRAMUSCULAR; INTRAVENOUS; SUBCUTANEOUS PRN
Status: DISCONTINUED | OUTPATIENT
Start: 2020-05-28 | End: 2020-05-28

## 2020-05-28 RX ADMIN — ONDANSETRON 4 MG: 2 INJECTION INTRAMUSCULAR; INTRAVENOUS at 08:11

## 2020-05-28 RX ADMIN — PROPOFOL 180 MG: 10 INJECTION, EMULSION INTRAVENOUS at 07:47

## 2020-05-28 RX ADMIN — KETOROLAC TROMETHAMINE 15 MG: 30 INJECTION, SOLUTION INTRAMUSCULAR at 08:15

## 2020-05-28 RX ADMIN — Medication 7.5 MG: at 08:05

## 2020-05-28 RX ADMIN — Medication 5 MG: at 08:00

## 2020-05-28 RX ADMIN — FENTANYL CITRATE 50 MCG: 50 INJECTION, SOLUTION INTRAMUSCULAR; INTRAVENOUS at 07:45

## 2020-05-28 RX ADMIN — MIDAZOLAM 2 MG: 1 INJECTION INTRAMUSCULAR; INTRAVENOUS at 07:39

## 2020-05-28 RX ADMIN — ACETAMINOPHEN 975 MG: 325 TABLET, FILM COATED ORAL at 06:21

## 2020-05-28 RX ADMIN — DEXAMETHASONE SODIUM PHOSPHATE 4 MG: 4 INJECTION, SOLUTION INTRA-ARTICULAR; INTRALESIONAL; INTRAMUSCULAR; INTRAVENOUS; SOFT TISSUE at 07:53

## 2020-05-28 RX ADMIN — SODIUM CHLORIDE, POTASSIUM CHLORIDE, SODIUM LACTATE AND CALCIUM CHLORIDE: 600; 310; 30; 20 INJECTION, SOLUTION INTRAVENOUS at 07:39

## 2020-05-28 RX ADMIN — PROPOFOL 50 MCG/KG/MIN: 10 INJECTION, EMULSION INTRAVENOUS at 07:47

## 2020-05-28 RX ADMIN — LIDOCAINE HYDROCHLORIDE 80 MG: 20 INJECTION, SOLUTION INFILTRATION; PERINEURAL at 07:45

## 2020-05-28 ASSESSMENT — MIFFLIN-ST. JEOR: SCORE: 1380.48

## 2020-05-28 ASSESSMENT — LIFESTYLE VARIABLES: TOBACCO_USE: 0

## 2020-05-28 NOTE — ANESTHESIA PREPROCEDURE EVALUATION
Anesthesia Pre-Procedure Evaluation    Patient: Elizabeth Cuellar   MRN: [id number] : 1955          Preoperative Diagnosis: Postmenopausal bleeding [N95.0]    Procedure(s):  EXAM UNDER ANESTHESIA ; HYSTEROSCOPY, WITH DILATION AND CURETTAGE ; ENDOMETRIAL POLYPECTOMY WITH MYOSURE  MORCELLATOR    Past Medical History:   Diagnosis Date     Arthritis     left ankle     Hyperlipidemia      Overactive bladder      Plantar fasciitis      Post-infection bronchospasm      Postmenopausal      Rhinitis      Thyroid disease      Vitamin B12 deficiency      Past Surgical History:   Procedure Laterality Date     ARTHROPLASTY ANKLE Left 2015    Procedure: ARTHROPLASTY ANKLE;  Surgeon: Joseph Valencia MD;  Location: [facility]     COLONOSCOPY       EYE SURGERY      lasik     ORTHOPEDIC SURGERY  1985    left ankle repair post fracture     ORTHOPEDIC SURGERY      right great toe surgery     REMOVE HARDWARE ANKLE Left 2015    Procedure: REMOVE HARDWARE ANKLE;  Surgeon: Joseph Valencia MD;  Location: [facility]     wisdom teeth extraction         Anesthesia Evaluation     .             ROS/MED HX    ENT/Pulmonary:      (-) tobacco use and sleep apnea   Neurologic:       Cardiovascular:     (+) Dyslipidemia, ----. : . . . :. .       METS/Exercise Tolerance:     Hematologic:         Musculoskeletal:   (+) arthritis,  -       GI/Hepatic:        (-) GERD   Renal/Genitourinary:         Endo:     (+) thyroid problem hypothyroidism, .      Psychiatric:         Infectious Disease:         Malignancy:         Other:                          Physical Exam  Normal systems: cardiovascular, pulmonary and dental    Airway   Mallampati: II  TM distance: >3 FB  Neck ROM: full    Dental     Cardiovascular       Pulmonary     Other findings: Missing one bottom right tooth        Lab Results   Component Value Date     2015    CR 0.80 2015       Preop Vitals  BP Readings from Last 3 Encounters:   20 (!) 156/64  "  09/03/15 118/63    Pulse Readings from Last 3 Encounters:   09/02/15 66      Resp Readings from Last 3 Encounters:   05/28/20 16   09/03/15 16    SpO2 Readings from Last 3 Encounters:   05/28/20 95%   09/03/15 95%      Temp Readings from Last 1 Encounters:   05/28/20 36.3  C (97.3  F) (Oral)    Ht Readings from Last 1 Encounters:   05/28/20 1.676 m (5' 6\")      Wt Readings from Last 1 Encounters:   05/28/20 82.4 kg (181 lb 9.6 oz)    Estimated body mass index is 29.31 kg/m  as calculated from the following:    Height as of this encounter: 1.676 m (5' 6\").    Weight as of this encounter: 82.4 kg (181 lb 9.6 oz).       Anesthesia Plan      History & Physical Review      ASA Status:  2 .    NPO Status:  > 8 hours    Plan for General with Intravenous and Propofol induction. Maintenance will be TIVA.    PONV prophylaxis:  Ondansetron (or other 5HT-3) and Dexamethasone or Solumedrol         Postoperative Care  Postoperative pain management:  IV analgesics.      Consents                 Rbo Chavarria MD  "

## 2020-05-28 NOTE — OP NOTE
Hysteroscopy Procedure Note    Date of Service: 5/28/2020    Surgeon(s):Surgeon(s) and Role:     * Anay Hurtado MD - Primary  Anesthesia Staff: Anesthesiologist: Rob Chavarria MD  CRNA: Neha Shaffer APRN CRNA  OR Staff: Circulator: Amy Canales RN  Scrub Person: Kenzie Murillo                                                                   Pre-Operative Diagnoses:   1. Postmenopausal bleeding  2. Endometrial polyps  3. History of HRT    Post-Operative Diagnoses: same    Procedure(s) performed: Exam under anesthesia, hysteroscopy, myosure polypectomy    Type of Anesthesia used: MAC with LMA, 1 % lidocaine for cervical block    Complications:  none, patient tolerated procedure well    Estimated Blood Loss:  5 mL    UOP: voided prior to case    IVF: 700 mL    Findings: 6 week size anteverted uterus, no adnexal fullness. Uterine cavity with multiple small polyps - left and right cornua, right side wall, and posterior cervix. Small 1cm anterior and posterior mucosal fibroids.  Fluid Deficit 85 mL, cutting time 2:04      Specimens: endometrial polypectomy, and directed endometrial curetting. Cervical polyp    Indications: Elizabeth Cuellar is a 64 year old G0 female presenting as referral from [facility] for postmenopausal bleeding. She started having moderate to heavy bleeding on 1/16 and it has continued since then.  She had been on oral estrogen 0.5 mcg daily and progesterone 200 mg daily for 12 days every quarter. An EMB on 1/28 shows endometrial polyp. She has stopped her HRT and her bleeding has since stopped.  Discussed underlying risk of hyperplasia or malginancy with polyp in postmenopausal patients and recommend polypectomy. R/B/A were dicussed and she elected to proceed.       Procedure:  The patient was taken to the OR. Time out was performed. EUA was performed and uterus was found to be 6 size and anteverted position. Patient was placed in dorsal  lithotomy position, prepped and draped in a normal sterile fashion.  A sterile speculum was placed in the patients vagina. A single tooth tenaculum was then applied to the anterior cervix. 1% lidocaine with epinepherine was injected into cervix. Boyle dilators were used to dilate the cervix to 17 Welsh. Hysteroscope was introduced. Multiple endometrial polyps were identified as desribed above. Myosure was introduced and mass was resected. Total cutting time with Myosure was 2:04.  Contents were sent to pathology. Cavity was found to be clear and hemostatic. No additional masses identified. Hysteroscope and myosure were removed. Fluid deficit was 85 ml. Tenaculum was removed. There was good hemostasis. Speculum was removed.    Sponge, instrument, and needle counts were correct x2 as reported to the surgeon.     The patient was extubated, and awoken from anesthesia without difficulty. She was taken to the PACU in stable condition.      Anay Hurtado MD  [phone]  05/28/20 8:19 AM

## 2020-05-28 NOTE — ANESTHESIA CARE TRANSFER NOTE
Patient: Elizabeth Cuellar    Procedure(s):  EXAM UNDER ANESTHESIA ; HYSTEROSCOPY, WITH DILATION AND CURETTAGE ; ENDOMETRIAL POLYPECTOMY WITH MYOSURE  MORCELLATOR    Diagnosis: Postmenopausal bleeding [N95.0]  Diagnosis Additional Information: No value filed.    Anesthesia Type:   General     Note:  Airway :Face Mask  Patient transferred to:PACU  Comments: VSS on arrival to PACU. Alert and talking, on 10L SFM 02. Report given to RN before transfer of patient care.Handoff Report: Identifed the Patient, Identified the Reponsible Provider, Reviewed the pertinent medical history, Discussed the surgical course, Reviewed Intra-OP anesthesia mangement and issues during anesthesia, Set expectations for post-procedure period and Allowed opportunity for questions and acknowledgement of understanding      Vitals: (Last set prior to Anesthesia Care Transfer)    CRNA VITALS  5/28/2020 0753 - 5/28/2020 0827      5/28/2020             NIBP:  128/80    NIBP Mean:  95                Electronically Signed By: SIM Boyer CRNA  May 28, 2020  8:27 AM

## 2020-05-28 NOTE — DISCHARGE INSTRUCTIONS
Today you were given 975 mg of Tylenol at 6:20am. The recommended daily maximum dose is 4000 mg.     Today you received Toradol, an antiinflammatory medication similar to Ibuprofen.  You should not take other antiinflammatory medication, such as Ibuprofen, Motrin, Advil, Aleve, Naprosyn, etc until 2:15pm.     Same Day Surgery Discharge Instructions for  Sedation and General Anesthesia       It's not unusual to feel dizzy, light-headed or faint for up to 24 hours after surgery or while taking pain medication.  If you have these symptoms: sit for a few minutes before standing and have someone assist you when you get up to walk or use the bathroom.      You should rest and relax for the next 24 hours. We recommend you make arrangements to have an adult stay with you for at least 24 hours after your discharge.  Avoid hazardous and strenuous activity.      DO NOT DRIVE any vehicle or operate mechanical equipment for 24 hours following the end of your surgery.  Even though you may feel normal, your reactions may be affected by the medication you have received.      Do not drink alcoholic beverages for 24 hours following surgery.       Slowly progress to your regular diet as you feel able. It's not unusual to feel nauseated and/or vomit after receiving anesthesia.  If you develop these symptoms, drink clear liquids (apple juice, ginger ale, broth, 7-up, etc. ) until you feel better.  If your nausea and vomiting persists for 24 hours, please notify your surgeon.        All narcotic pain medications, along with inactivity and anesthesia, can cause constipation. Drinking plenty of liquids and increasing fiber intake will help.      For any questions of a medical nature, call your surgeon.      Do not make important decisions for 24 hours.      If you had general anesthesia, you may have a sore throat for a couple of days related to the breathing tube used during surgery.  You may use Cepacol lozenges to help with this  discomfort.  If it worsens or if you develop a fever, contact your surgeon.       If you feel your pain is not well managed with the pain medications prescribed by your surgeon, please contact your surgeon's office to let them know so they can address your concerns.       CoVid 19 Information    We want to give you information regarding Covid. Please consult your primary care provider with any questions you might have.     Patient who have symptoms (cough, fever, or shortness of breath), need to isolate for 7 days from when symptoms started OR 72 hours after fever resolves (without fever reducing medications) AND improvement of respiratory symptoms (whichever is longer).      Isolate yourself at home (in own room/own bathroom if possible)    Do Not allow any visitors    Do Not go to work or school    Do Not go to [employer],  centers, shopping, or other public places.    Do Not shake hands.    Avoid close and intimate contact with others (hugging, kissing).    Follow CDC recommendations for household cleaning of frequently touched services.     After the initial 7 days, continue to isolate yourself from household members as much as possible. To continue decrease the risk of community spread and exposure, you and any members of your household should limit activities in public for 14 days after starting home isolation.     You can reference the following CDC link for helpful home isolation/care tips:  https://www.cdc.gov/coronavirus/2019-ncov/downloads/10Things.pdf    Protect Others:    Cover Your Mouth and Nose with a mask, disposable tissue or wash cloth to avoid spreading germs to others.    Wash your hands and face frequently with soap and water    Call Your Primary Doctor If: Breathing difficulty develops or you become worse.    For more information about COVID19 and options for caring for yourself at home, please visit the CDC website at  https://www.cdc.gov/coronavirus/2019-ncov/about/steps-when-sick.html  For more options for care at [facility], please visit our website at [email or web address]    HOME CARE FOLLOWING HYSTEROSCOPY    Diet  You have no restrictions on your diet.  During the evening following surgery, drink plenty of fluids and eat a light supper.    Nausea  The anesthesia may produce some nausea.  If you feel nauseated, stay in bed and try drinking fluids such as 7-Up, tea, or soup.    Discomfort  The amount of discomfort you can expect is very unpredictable.  If you have pain that cannot be controlled with Tylenol or with the prescription you may have received, you should notify your physician.  Abdominal cramping or low backache is not uncommon and should not be a cause for concern.  You will be drowsy and weak the day of surgery and possibly the following day.  Fever  A low grade fever (not over 100 F) is usual after this procedure.  Do not hesitate to notify your physician if your fever seems excessive or persists.    Activity   You may resume your normal activity.  Avoid heavy lifting for one week.  You may bathe or shower.  Do not douche, use tampons, or resume intercourse until the bleeding has ceased.    Emergency Care  Contact your physician if you have any of these problems:   1.  A fever over 100 F   2.  A large amount of bleeding or drainage   3.  Severe pain         **If you have questions or concerns about your procedure,   call Dr. Hurtado [phone]**

## 2020-05-28 NOTE — OR NURSING
Discharge instructions reviewed with pt and chris Krishnamurthy over the phone.  Pt discharged to home with .

## 2020-05-28 NOTE — ANESTHESIA POSTPROCEDURE EVALUATION
Patient: Elizabeth Cuellar    Procedure(s):  EXAM UNDER ANESTHESIA ; HYSTEROSCOPY, WITH DILATION AND CURETTAGE ; ENDOMETRIAL POLYPECTOMY WITH MYOSURE  MORCELLATOR    Diagnosis:Postmenopausal bleeding [N95.0]  Diagnosis Additional Information: No value filed.    Anesthesia Type:  General    Note:  Anesthesia Post Evaluation    Patient location during evaluation: PACU  Patient participation: Able to fully participate in evaluation  Level of consciousness: awake and alert  Pain management: adequate  Airway patency: patent  Cardiovascular status: acceptable  Respiratory status: acceptable  Hydration status: acceptable  PONV: none     Anesthetic complications: None          Last vitals:  Vitals:    05/28/20 0900 05/28/20 0915 05/28/20 0930   BP: (!) 117/93 (!) 132/95 136/73   Pulse: 68 56 50   Resp: 11 13 10   Temp:      SpO2: 98% 100% 99%         Electronically Signed By: Rob Chavarria MD  May 28, 2020  10:29 AM

## 2020-05-29 LAB — COPATH REPORT: NORMAL

## 2020-09-21 ENCOUNTER — HOSPITAL ENCOUNTER (OUTPATIENT)
Dept: MAMMOGRAPHY | Facility: CLINIC | Age: 65
Discharge: HOME | End: 2020-09-21
Attending: FAMILY MEDICINE | Admitting: FAMILY MEDICINE
Payer: COMMERCIAL

## 2020-09-21 DIAGNOSIS — Z12.31 VISIT FOR SCREENING MAMMOGRAM: ICD-10-CM

## 2020-09-21 PROCEDURE — 77063 BREAST TOMOSYNTHESIS BI: CPT

## 2021-10-22 ENCOUNTER — HOSPITAL ENCOUNTER (OUTPATIENT)
Dept: MAMMOGRAPHY | Facility: CLINIC | Age: 66
End: 2021-10-22
Attending: FAMILY MEDICINE
Payer: COMMERCIAL

## 2021-10-22 DIAGNOSIS — N63.11 UNSPECIFIED LUMP IN THE RIGHT BREAST, UPPER OUTER QUADRANT: ICD-10-CM

## 2021-10-22 PROCEDURE — 76642 ULTRASOUND BREAST LIMITED: CPT | Mod: RT

## 2021-10-22 PROCEDURE — 77062 BREAST TOMOSYNTHESIS BI: CPT

## 2022-08-30 ENCOUNTER — TRANSCRIBE ORDERS (OUTPATIENT)
Dept: OTHER | Age: 67
End: 2022-08-30

## 2022-08-30 DIAGNOSIS — N39.3 STRESS INCONTINENCE: Primary | ICD-10-CM

## 2022-11-18 ENCOUNTER — TRANSFERRED RECORDS (OUTPATIENT)
Dept: PHYSICAL THERAPY | Facility: CLINIC | Age: 67
End: 2022-11-18

## 2022-11-18 ENCOUNTER — HOSPITAL ENCOUNTER (OUTPATIENT)
Dept: MAMMOGRAPHY | Facility: CLINIC | Age: 67
Discharge: HOME | End: 2022-11-18
Admitting: FAMILY MEDICINE
Payer: COMMERCIAL

## 2022-11-18 ENCOUNTER — THERAPY VISIT (OUTPATIENT)
Dept: PHYSICAL THERAPY | Facility: CLINIC | Age: 67
End: 2022-11-18
Payer: COMMERCIAL

## 2022-11-18 DIAGNOSIS — M99.05 SOMATIC DYSFUNCTION OF PELVIC REGION: Primary | ICD-10-CM

## 2022-11-18 DIAGNOSIS — Z12.31 ENCOUNTER FOR SCREENING MAMMOGRAM FOR MALIGNANT NEOPLASM OF BREAST: ICD-10-CM

## 2022-11-18 DIAGNOSIS — N39.46 MIXED INCONTINENCE URGE AND STRESS (MALE)(FEMALE): ICD-10-CM

## 2022-11-18 PROCEDURE — 97110 THERAPEUTIC EXERCISES: CPT | Mod: GP

## 2022-11-18 PROCEDURE — 77067 SCR MAMMO BI INCL CAD: CPT

## 2022-11-18 PROCEDURE — 97530 THERAPEUTIC ACTIVITIES: CPT | Mod: GP

## 2022-11-18 PROCEDURE — 97161 PT EVAL LOW COMPLEX 20 MIN: CPT | Mod: GP

## 2022-11-18 PROCEDURE — 97140 MANUAL THERAPY 1/> REGIONS: CPT | Mod: GP

## 2022-11-18 NOTE — PROGRESS NOTES
Physical Therapy Initial Evaluation  Subjective:  Elizabeth reports the past few yrs she will leak small amounts of urine w/ urgency, lifting/sneezing. This will happen a few times a day. She reports a history of painful intercourse and has stopped being intimate, reports this is not a goal of hers at the moment. She reports she is constipated and bloated because of some of her medications, will occ take Miralax to help. She will have a daily BM, usually small type I stools. Feels like she doesn't fully empty. Feels she has good fiber intake, eating lots of fruits and veggies. Denies sx pressure/heaviness.     Exercise: enjoys gardening, does yoga w/ a little pelvic floor ex 2x/wk.   Goals: Control urine leakage    Urination:  Do you leak on the way to the bathroom or with a strong urge to void? y   Do you leak with cough,sneeze, jumping, running? y  Any other activities that cause leaking?  n  Do you have triggers that make you feel you can't wait to go to the bathroom?  What are they? n  Type of pad and number used per day? Always, 1-2/d  When you leak what is the amount? sm  How long can you delay the need to urinate? minutes  Frequency of daytime urination: every couple hours, notes that tea can increase her frequency  Frequency of nighttime urination:1  Can you stop the flow of urine when on the toilet? y  Is the volume of urine passed usually:  (8sec rule= 250ml with average bladder storing 400-600ml) avg  Do you strain to pass urine? n  Do you have a slow or hesitant urinary stream? n  Do you have difficulty initiating the urine stream? n  Is urination painful? n  How many bladder infections have you had in last 12 months?0  Fluid intake(one glass is 8oz or one cup) 5 glasses/day, 3 caffinated glasses/day  - alcohol glasses/day.    Bowel habits:  Frequency of bowel movements? 1 times a d  Consistancy of stool? hard  Do you ignore the urge to defecate? n  Do you strain to pass stool? y    Pelvic Pain:  Do you have  "any pelvic pain with intercourse, exams, use of tampons? never  Are you sexually active? n  Is initial penetration during intercourse painful? \"It was, so I stopped\"  Is deeper penetration painful? n/a  Do you use lubricant?  n/a  Have you ever been worried for your physical safety? n  Have you practiced the PF(kegel) exercises for 4 or more weeks?n  Marinoff Scale:Level  3  (Level 3: Abstinence from intercourse because of severe pain. Level 2: Painful intercourse which limites frequency of activity. Level 1: Painful intercourse not severe enough to prevent activity.)      The history is provided by the patient. No  was used.                       Objective:  Standing Alignment:    Cervical/Thoracic:  Dowager's hump and thoracic kyphosis increased                               Lumbar/SI Evaluation  ROM:  AROM Lumbar: normal                                                Pelvic Dysfunction Evaluation:          Abdominal Wall:  normal  Diastasis Recti:  Negative  Trigger Points:  NA    Pelvic Clock Exam:          Levator ANI:  +        External Assessment:  External assessment pelvic: Absent knack w/ cough.  Skin Condition:  Atrophic          Muscle Contraction/Perineal Mobility:  Elevation and urogential triangle descent and substitution  Internal Assessment:  Internal assessment pelvic: PERF: 4, 10,-, 10. Glute substitutions intially and pt has delayed relaxation following contraction. Inc tone in bilat LA and OI that is midly uncomfortable to palpation.    Contraction/Grade:  Good squeeze, good hold with lift, repeatable (4)          Additional History:    Number of Pregnancies: 0              Hip Evaluation  Hip PROM:  Hip PROM:  Left Hip:    Normal  Right Hip:  Normal                          Hip Strength:    Flexion:   Left: 5/5   Pain:  Right: 5/5   Pain:                    Extension:  Left: 4/5  Pain:Right: 4/5    Pain:    Abduction:  Left: 4/5     Pain:Right: 3+/5    Pain:    Internal " Rotation:  Left: 4+/5    Pain:Right: 4+/5   Pain:  External Rotation:  Left: 4+/5   Pain:  Right: 4+/5   Pain:                           General     ROS    Assessment/Plan:    Patient is a 67 year old female with pelvic complaints.    Patient has the following significant findings with corresponding treatment plan.                Diagnosis 1:  RUTH ANN  Decreased ROM/flexibility - manual therapy, therapeutic exercise, therapeutic activity and home program  Decreased strength - therapeutic exercise, therapeutic activities and home program  Impaired muscle performance - biofeedback, electric stimulation, neuro re-education and home program  Decreased function - therapeutic activities, home program, functional performance testing and manual therapy  Diagnosis 2:  Constipation   -please see above    Therapy Evaluation Codes:   1) History comprised of:   Personal factors that impact the plan of care:      None.    Comorbidity factors that impact the plan of care are:      Depression, Menopausal, Osteoarthritis and Overweight.     Medications impacting care: Anti-depressant.  2) Examination of Body Systems comprised of:   Body structures and functions that impact the plan of care:      Hip, Knee, Lumbar spine and Pelvis.   Activity limitations that impact the plan of care are:      Lifting, Sports, [place], Urgency and Urinary incontinence.  3) Clinical presentation characteristics are:   Stable/Uncomplicated.  4) Decision-Making    Low complexity using standardized patient assessment instrument and/or measureable assessment of functional outcome.  Cumulative Therapy Evaluation is: Low complexity.    Previous and current functional limitations:  (See Goal Flow Sheet for this information)    Short term and Long term goals: (See Goal Flow Sheet for this information)     Communication ability:  Patient appears to be able to clearly communicate and understand verbal and written communication and follow directions  correctly.  Treatment Explanation - The following has been discussed with the patient:   RX ordered/plan of care  Anticipated outcomes  Possible risks and side effects  This patient would benefit from PT intervention to resume normal activities.   Rehab potential is good.    Frequency:  2 X a month, once daily  Duration:  for 3 months  Discharge Plan:  Achieve all LTG.  Independent in home treatment program.  Reach maximal therapeutic benefit.    Please refer to the daily flowsheet for treatment today, total treatment time and time spent performing 1:1 timed codes.     Keeley Rodriguez, PT, DPT

## 2022-11-18 NOTE — PROGRESS NOTES
Psychiatric    OUTPATIENT Physical Therapy ORTHOPEDIC EVALUATION  PLAN OF TREATMENT FOR OUTPATIENT REHABILITATION  (COMPLETE FOR INITIAL CLAIMS ONLY)  Patient's Last Name, First Name, M.I.  YOB: 1955  Elizabeth Cuellar    Provider s Name:  Psychiatric   Medical Record No.  [id number]   Start of Care Date:  11/18/22   Onset Date:    8/29/22 (MD order date)   Treatment Diagnosis:  Mixed UI Medical Diagnosis:     Somatic dysfunction of pelvic region  Mixed incontinence urge and stress (male)(female)       Goals:     11/18/22 0700   Urinary Leakage   Current Functional Level Leaking with urge;Leaking with;Leakage with cough or sneeze   Performance Level Leaks w/ lifting, cough, urge on daily basis   STG Target Performance Decrease urinary leakage episodes in a week to   Performance Level 4   Rationale continence throughout the day;for healthy hygiene and to prevent skin breakdown;continence throughout the night;continence during work   Due Date 12/16/22   LTG Target Performance Decrease urinary leakage episodes in a month to   Performance Level 2   Rationale continence throughout the night;continence throughout the day;continence during work;for healthy hygiene and to prevent skin breakdown   Due Date 02/10/23        Therapy Frequency:   2x/mo  Predicted Duration of Therapy Intervention:  12 wk    COREEN MAHMOOD, PT                 I CERTIFY THE NEED FOR THESE SERVICES FURNISHED UNDER        THIS PLAN OF TREATMENT AND WHILE UNDER MY CARE .             Physician Signature               Date    X_____________________________________________________                         Certification Date From:  11/18/22   Certification Date To:  02/10/23    Referring Provider:  Adrianna Galan    Initial Assessment        See Epic Evaluation SOC Date: 11/18/22

## 2022-11-28 ENCOUNTER — THERAPY VISIT (OUTPATIENT)
Dept: PHYSICAL THERAPY | Facility: CLINIC | Age: 67
End: 2022-11-28
Payer: COMMERCIAL

## 2022-11-28 DIAGNOSIS — N39.46 MIXED INCONTINENCE URGE AND STRESS (MALE)(FEMALE): ICD-10-CM

## 2022-11-28 DIAGNOSIS — M99.05 SOMATIC DYSFUNCTION OF PELVIC REGION: Primary | ICD-10-CM

## 2022-11-28 PROCEDURE — 97110 THERAPEUTIC EXERCISES: CPT | Mod: GP | Performed by: PHYSICAL THERAPIST

## 2022-11-28 PROCEDURE — 97140 MANUAL THERAPY 1/> REGIONS: CPT | Mod: GP | Performed by: PHYSICAL THERAPIST

## 2022-12-14 ENCOUNTER — THERAPY VISIT (OUTPATIENT)
Dept: PHYSICAL THERAPY | Facility: CLINIC | Age: 67
End: 2022-12-14
Payer: COMMERCIAL

## 2022-12-14 DIAGNOSIS — M99.05 SOMATIC DYSFUNCTION OF PELVIC REGION: Primary | ICD-10-CM

## 2022-12-14 DIAGNOSIS — N39.46 MIXED INCONTINENCE URGE AND STRESS (MALE)(FEMALE): ICD-10-CM

## 2022-12-14 PROCEDURE — 97140 MANUAL THERAPY 1/> REGIONS: CPT | Mod: GP | Performed by: PHYSICAL THERAPIST

## 2022-12-14 PROCEDURE — 97110 THERAPEUTIC EXERCISES: CPT | Mod: GP | Performed by: PHYSICAL THERAPIST

## 2022-12-14 PROCEDURE — 97530 THERAPEUTIC ACTIVITIES: CPT | Mod: GP | Performed by: PHYSICAL THERAPIST

## 2023-01-25 ENCOUNTER — THERAPY VISIT (OUTPATIENT)
Dept: PHYSICAL THERAPY | Facility: CLINIC | Age: 68
End: 2023-01-25
Payer: COMMERCIAL

## 2023-01-25 DIAGNOSIS — M99.05 SOMATIC DYSFUNCTION OF PELVIC REGION: ICD-10-CM

## 2023-01-25 DIAGNOSIS — N39.46 MIXED INCONTINENCE URGE AND STRESS (MALE)(FEMALE): Primary | ICD-10-CM

## 2023-01-25 PROCEDURE — 97530 THERAPEUTIC ACTIVITIES: CPT | Mod: GP | Performed by: PHYSICAL THERAPIST

## 2023-01-25 PROCEDURE — 97110 THERAPEUTIC EXERCISES: CPT | Mod: GP | Performed by: PHYSICAL THERAPIST

## 2023-01-25 PROCEDURE — 97140 MANUAL THERAPY 1/> REGIONS: CPT | Mod: GP | Performed by: PHYSICAL THERAPIST

## 2023-01-25 NOTE — PROGRESS NOTES
Subjective:  HPI  Physical Exam                    Objective:  System    Physical Exam    General     ROS    Assessment/Plan:    DISCHARGE REPORT    Progress reporting period is from 11/18/2022 to 1/25/2023.       SUBJECTIVE  Subjective changes noted by patient:  .  Subjective: Patient reports it's working.  Not having to wear a pad now with going out. Can not feel the letting go.  Can not do happy baby because knee hurts. Left first toe hurts as well    Current pain level is 0/10  .     Previous pain level was  0/10  .   Changes in function:  Yes (See Goal flowsheet attached for changes in current functional level)  Adverse reaction to treatment or activity: None    OBJECTIVE  Changes noted in objective findings:  Yes,   Objective: Modified stretches so that knee does not hurt.  Discussed big toe arthritis taping.   Good relaxation after contraction.  Discussed how to keep up with manual self stretching.  Showed patient various dilators and presented information on purchasing.     ASSESSMENT/PLAN  Updated problem list and treatment plan: Diagnosis 1:  Mixed incontinence  Decreased ROM/flexibility - manual therapy, therapeutic exercise and home program  Decreased strength - therapeutic exercise, therapeutic activities and home program  Impaired muscle performance - neuro re-education and home program  Decreased function - therapeutic activities and home program  STG/LTGs have been met or progress has been made towards goals:  Yes (See Goal flow sheet completed today.)  Assessment of Progress: The patient's condition is improving.  The patient's condition has potential to improve.  Self Management Plans:  Patient has been instructed in a home treatment program.    Elizabeth continues to require the following intervention to meet STG and LTG's:  Patient needs to continue to work on the home exercise program.      Recommendations:  This patient is ready to be discharged from therapy and continue their home treatment  program.    Please refer to the daily flowsheet for treatment today, total treatment time and time spent performing 1:1 timed codes.

## 2023-04-27 ENCOUNTER — THERAPY VISIT (OUTPATIENT)
Dept: PHYSICAL THERAPY | Facility: CLINIC | Age: 68
End: 2023-04-27
Payer: COMMERCIAL

## 2023-04-27 DIAGNOSIS — M25.561 RIGHT KNEE PAIN, UNSPECIFIED CHRONICITY: Primary | ICD-10-CM

## 2023-04-27 PROCEDURE — 97161 PT EVAL LOW COMPLEX 20 MIN: CPT | Mod: GP | Performed by: PHYSICAL THERAPIST

## 2023-04-27 PROCEDURE — 97110 THERAPEUTIC EXERCISES: CPT | Mod: GP | Performed by: PHYSICAL THERAPIST

## 2023-04-27 NOTE — PROGRESS NOTES
Physical Therapy Initial Evaluation  Subjective:  Pt comes to PT with c/o chronic R knee pain. Last seen by MD for knee pain on 4/18/2023. Per imaging, demonstrates lateral malalignment of R patella. Current symptoms include stiffness, weakness, and aching pain. Pt had injection last Tuesday to anterior R knee, which significantly improved pain. Having surgery on R big toe in 2 weeks and will have NWB precautions. Hx of L ankle fusion. Descending stairs is most painful activity for R knee. Pt lives in a multi-level home with 13 steps. Does yoga 1x a week. Goal is to have more knee mobility and less pain with kneeling/squatting to allow for gardening work and less pain/weakness with descending stairs.                               Objective:        Flexibility/Screens:       Lower Extremity:  Decreased left lower extremity flexibility:Piriformis; Quadriceps; Hamstrings and Gastroc    Decreased right lower extremity flexibility:  Piriformis; Quadriceps; Hamstrings and Gastroc          Ankle/Foot Evaluation  ROM:  Arom ankle eval: L ankle fusion limits AROM.  AROM:    Dorsiflexion:  Left:   Limited  Right:   WFL  Plantarflexion:  Left:  Limited    Right:  WFL                                                               Hip Evaluation  HIP AROM:  AROM:    Left Hip:     Normal    Right Hip:   Normal                    Hip Strength:    Flexion:   Left: 4+/5   Pain:  Right: 4+/5   Pain:                    Extension:  Left: 3/5  Pain:Right: 3/5    Pain:    Abduction:  Left: 4/5     Pain:Right: 4-/5    Pain:                  Hip Special Testing:      Left hip negative for the following special tests:  Piriformis; Patricia; Fadir/Labrum or SLR   Right hip positive for the following special tests:  PiriformisRight hip negative for the following special tests:  Patricia; Fadir/Labrum or SLR           Knee Evaluation:  ROM:    AROM      Extension:  Left: 0    Right:  0  Flexion: Left: 135    Right: 135        Strength:     Extension:   Left: 5/5   Pain:      Right: 5/5   Pain:  Flexion:  Left: 4/5   Pain:      Right: 4/5   Pain:    Quad Set Left: Good    Pain:   Quad Set Right: Good    Pain:        Edema:  Normal    Mobility Testing:      Patellofemoral Medial:  Left: normal    Right: hypomobile  Patellofemoral Lateral:  Left: normal      Patellofemoral Superior:  Left: normal    Right: hypomobile  Patellofemoral Inferior:  Left: normal    Right: hypomobile  Functional Testing:          Quad:    Single Leg Squat:  Left:      Right:        Bilateral Leg Squat:  Decreased knee excursion d/t ankle fusion     Retro Step Up: Left:    Right: Poor eccentric control of quadriceps    % of Uninvolved:           General     ROS    Assessment/Plan:    Patient is a 67 year old female with right side knee complaints.    Patient has the following significant findings with corresponding treatment plan.                Diagnosis 1:  R knee pain  Pain -  hot/cold therapy, US, electric stimulation, manual therapy, STS, splint/taping/bracing/orthotics, self management, education and home program  Decreased ROM/flexibility - manual therapy, therapeutic exercise, therapeutic activity and home program  Decreased joint mobility - manual therapy, therapeutic exercise, therapeutic activity and home program  Decreased strength - therapeutic exercise, therapeutic activities and home program  Impaired gait - gait training and home program  Decreased function - therapeutic activities, home program and functional performance testing    Therapy Evaluation Codes:   1) History comprised of:   Personal factors that impact the plan of care:      Time since onset of symptoms.    Comorbidity factors that impact the plan of care are:      Depression, Menopausal, Osteoarthritis and Overweight.     Medications impacting care: Anti-depressant, Anti-inflammatory and Pain.  2) Examination of Body Systems comprised of:   Body structures and functions that impact the plan of care:       Knee.   Activity limitations that impact the plan of care are:      Jumping, Running, Sports, Squatting/kneeling, Stairs, Standing and Walking.  3) Clinical presentation characteristics are:   Stable/Uncomplicated.  4) Decision-Making    Low complexity using standardized patient assessment instrument and/or measureable assessment of functional outcome.  Cumulative Therapy Evaluation is: Low complexity.    Previous and current functional limitations:  (See Goal Flow Sheet for this information)    Short term and Long term goals: (See Goal Flow Sheet for this information)     Communication ability:  Patient appears to be able to clearly communicate and understand verbal and written communication and follow directions correctly.  Treatment Explanation - The following has been discussed with the patient:   RX ordered/plan of care  Anticipated outcomes  Possible risks and side effects  This patient would benefit from PT intervention to resume normal activities.   Rehab potential is good.    Frequency:  2 X a month, once daily  Duration:  for 6 visits  Discharge Plan:  Achieve all LTG.  Independent in home treatment program.  Reach maximal therapeutic benefit.    Please refer to the daily flowsheet for treatment today, total treatment time and time spent performing 1:1 timed codes.

## 2023-04-28 NOTE — PROGRESS NOTES
04/27/23 0500   Body Part   Goals listed below are for right knee   Goal #1   Goal #1 stairs   Previous Functional Level Ascend/descend stairs   Performance Level Minimal pain and reciprocal gait pattern   Current Functional Level Descend stairs;one step at a time;with a railing   Performance Level Poor eccentric quad control and moderate pain   STG Target Performance Descend stairs;one step at a time;with a railing   Performance Level With good eccentric quad control and no more than 4/10 pain   Rationale to reach lower level of home safely   Due Date 06/27/23   LTG Target Performance Descend stairs;in a normal reciprocal pattern;with railing   Performance Level With good eccentric quad control and no more than 4/10 pain   Rationale to reach lower level of home safely   Due Date 07/27/23

## 2023-04-28 NOTE — PROGRESS NOTES
04/27/23 0500   Treating Provider   Treating Provider Tanisha Maloney, SPT // Direct Supervision: Adia Magana, PT, ATC   Contact Information   Procedure Code: The timed procedures billed today were performed sequentially within this encounter. Therapeutic Exercise;Manual Therapy;Other   Minutes: Therapeutic exercise 18   Minutes: Manual therapy 8   Minutes: Other 15 eval   Rxs Authorized 6   Rxs Used 1   Diagnosis right knee pain   Insurance Medicare   Total Treatment Time (minutes) 40   Timed Code Treatment Minutes 26   SOC Date 04/27/23   Beginning of Cert date period 04/27/23   End of Cert period date 07/25/23   Therapy Frequency 2 times a month   Predicted Duration of Therapy Intervention (days/wks) 12 weeks   DOI 04/18/23  (date of md visit)   Subjective Pt comes to PT with c/o chronic R knee pain. Per imaging, demonstrates lateral malalignment of R patella. Current symptoms include stiffness, weakness, and aching pain. Pt had injection last Tuesday to anterior R knee, which significantly improved pain. Having surgery on R big toe in 2 weeks and will have NWB precautions. Hx of L ankle fusion. Descending stairs is most painful activity for R knee. Pt lives in a multi-level home with 13 steps. Does yoga 1x a week. Goal is to have more knee mobility and less pain with kneeling/squatting to allow for gardening work and less pain/weakness with descending stairs.   Objective 0-135 deg AROM bilat. Lateral malalignment of R patella, hypomobile with medial, sup, inf glides. Poor eccentric quad control w/ descending step.   Initial Pain level 2/10   Other pertinent information R hallux surgery week of 5/7   Manual Therapy - Self mobs are part of the HEP unless other   Joint Mobilization  Patellar mobilization applying grade II-III force, medial and superior/inferior glides x5 min   TFM  Friction massage to tissue lateral to patella x3 min   PTRX Therapeutic Exercise   Therapeutic Exercise 1 Hip Flexion Straight Leg  Raise   Therapeutic Exercise Notes 1 x10 bilat   Therapeutic Exercise 2 Hip Abduction Straight Leg Raise   Therapeutic Exercise Notes 2 x10 bilat   Therapeutic Exercise 3 Prone Hip Extension w/ Bent Knee   Therapeutic Exercise Notes 3 x10 bilat   Therapeutic Exercise 4 Towel Stretch Gastroc   Therapeutic Exercise Notes 4 x20 sec   Therapeutic Exercise 5 Lateral Step Down   Therapeutic Exercise Notes 5 2x10 R off 3 in step   Therapeutic Exercise 6 Patella Mobilization Medial   Therapeutic Exercise Notes 6 Instructed   Therapeutic Exercise 7 Patella Mobilization Superior/Inferior   Therapeutic Exercise Notes 7 Instructed   PTRX Neuromuscular Re-education    Neuromuscular Re-education 1 Quad Set Without Towel Roll Under Knee   Neuromuscular Re-Education Exercise 1 x10   Assessment   Adverse reactions None   Progress towards STG/LTG Continues to require PT intervention to meet STG/LTG   Plan   Plan Continue same Rx plan until Pt demonstrates readiness to progress to more complex exercises

## 2023-04-28 NOTE — PROGRESS NOTES
[facility]    OUTPATIENT Physical Therapy ORTHOPEDIC EVALUATION  PLAN OF TREATMENT FOR OUTPATIENT REHABILITATION  (COMPLETE FOR INITIAL CLAIMS ONLY)  Patient's Last Name, First Name, M.I.  YOB: 1955  Elizabeth Cuellar    Provider s Name:  [facility]   Medical Record No.  [id number]   Start of Care Date:  04/27/23   Onset Date:   04/18/23 (date of md visit)   Treatment Diagnosis:  right knee pain Medical Diagnosis:  Right knee pain, unspecified chronicity       Goals:     04/27/23 0500   Body Part   Goals listed below are for right knee   Goal #1   Goal #1 stairs   Previous Functional Level Ascend/descend stairs   Performance Level Minimal pain and reciprocal gait pattern   Current Functional Level Descend stairs;one step at a time;with a railing   Performance Level Poor eccentric quad control and moderate pain   STG Target Performance Descend stairs;one step at a time;with a railing   Performance Level With good eccentric quad control and no more than 4/10 pain   Rationale to reach lower level of home safely   Due Date 06/27/23   LTG Target Performance Descend stairs;in a normal reciprocal pattern;with railing   Performance Level With good eccentric quad control and no more than 4/10 pain   Rationale to reach lower level of home safely   Due Date 07/27/23         Therapy Frequency:  2 times a month  Predicted Duration of Therapy Intervention:  12 weeks    Adia Magana PT                 I CERTIFY THE NEED FOR THESE SERVICES FURNISHED UNDER        THIS PLAN OF TREATMENT AND WHILE UNDER MY CARE .             Physician Signature               Date    X_____________________________________________________                         Certification Date From:  04/27/23   Certification Date To:  07/25/23    Referring Provider:  Anay Pedraza MD    Initial  Assessment        See Epic Evaluation SOC Date: 04/27/23

## 2023-05-01 ENCOUNTER — TRANSCRIBE ORDERS (OUTPATIENT)
Dept: OTHER | Age: 68
End: 2023-05-01

## 2023-05-01 DIAGNOSIS — M17.11 OSTEOARTHRITIS OF RIGHT KNEE: ICD-10-CM

## 2023-05-01 DIAGNOSIS — M17.11 PATELLOFEMORAL ARTHRITIS OF RIGHT KNEE: Primary | ICD-10-CM

## 2023-05-24 ENCOUNTER — THERAPY VISIT (OUTPATIENT)
Dept: PHYSICAL THERAPY | Facility: CLINIC | Age: 68
End: 2023-05-24
Payer: COMMERCIAL

## 2023-05-24 DIAGNOSIS — M25.561 RIGHT KNEE PAIN, UNSPECIFIED CHRONICITY: Primary | ICD-10-CM

## 2023-05-24 PROCEDURE — 97110 THERAPEUTIC EXERCISES: CPT | Mod: GP | Performed by: PHYSICAL THERAPIST

## 2023-05-24 PROCEDURE — 97140 MANUAL THERAPY 1/> REGIONS: CPT | Mod: GP | Performed by: PHYSICAL THERAPIST

## 2023-05-31 ENCOUNTER — THERAPY VISIT (OUTPATIENT)
Dept: PHYSICAL THERAPY | Facility: CLINIC | Age: 68
End: 2023-05-31
Payer: COMMERCIAL

## 2023-05-31 DIAGNOSIS — M25.561 RIGHT KNEE PAIN, UNSPECIFIED CHRONICITY: Primary | ICD-10-CM

## 2023-05-31 PROCEDURE — 97110 THERAPEUTIC EXERCISES: CPT | Mod: GP | Performed by: PHYSICAL THERAPIST

## 2023-05-31 PROCEDURE — 97140 MANUAL THERAPY 1/> REGIONS: CPT | Mod: GP | Performed by: PHYSICAL THERAPIST

## 2023-06-05 PROCEDURE — 88305 TISSUE EXAM BY PATHOLOGIST: CPT | Mod: TC,ORL | Performed by: OBSTETRICS & GYNECOLOGY

## 2023-06-06 ENCOUNTER — LAB REQUISITION (OUTPATIENT)
Dept: LAB | Facility: CLINIC | Age: 68
End: 2023-06-06
Payer: COMMERCIAL

## 2023-06-07 LAB
PATH REPORT.COMMENTS IMP SPEC: NORMAL
PATH REPORT.COMMENTS IMP SPEC: NORMAL
PATH REPORT.FINAL DX SPEC: NORMAL
PATH REPORT.GROSS SPEC: NORMAL
PATH REPORT.MICROSCOPIC SPEC OTHER STN: NORMAL
PATH REPORT.RELEVANT HX SPEC: NORMAL
PHOTO IMAGE: NORMAL

## 2023-06-07 PROCEDURE — 88305 TISSUE EXAM BY PATHOLOGIST: CPT | Mod: 26 | Performed by: PATHOLOGY

## 2023-06-08 ENCOUNTER — THERAPY VISIT (OUTPATIENT)
Dept: PHYSICAL THERAPY | Facility: CLINIC | Age: 68
End: 2023-06-08
Payer: COMMERCIAL

## 2023-06-08 DIAGNOSIS — M25.561 RIGHT KNEE PAIN, UNSPECIFIED CHRONICITY: Primary | ICD-10-CM

## 2023-06-08 PROCEDURE — 97110 THERAPEUTIC EXERCISES: CPT | Mod: GP | Performed by: PHYSICAL THERAPIST

## 2023-06-08 PROCEDURE — 97140 MANUAL THERAPY 1/> REGIONS: CPT | Mod: GP | Performed by: PHYSICAL THERAPIST

## 2023-07-06 ENCOUNTER — THERAPY VISIT (OUTPATIENT)
Dept: PHYSICAL THERAPY | Facility: CLINIC | Age: 68
End: 2023-07-06
Payer: COMMERCIAL

## 2023-07-06 ENCOUNTER — TRANSCRIBE ORDERS (OUTPATIENT)
Dept: OTHER | Age: 68
End: 2023-07-06

## 2023-07-06 DIAGNOSIS — M25.561 RIGHT KNEE PAIN, UNSPECIFIED CHRONICITY: Primary | ICD-10-CM

## 2023-07-06 DIAGNOSIS — Q70.20: Primary | ICD-10-CM

## 2023-07-06 PROCEDURE — 97110 THERAPEUTIC EXERCISES: CPT | Mod: GP | Performed by: PHYSICAL THERAPIST

## 2023-07-06 PROCEDURE — 97140 MANUAL THERAPY 1/> REGIONS: CPT | Mod: GP | Performed by: PHYSICAL THERAPIST

## 2023-07-10 ENCOUNTER — TRANSCRIBE ORDERS (OUTPATIENT)
Dept: OTHER | Age: 68
End: 2023-07-10

## 2023-07-10 DIAGNOSIS — N39.3 STRESS INCONTINENCE: Primary | ICD-10-CM

## 2023-07-13 ENCOUNTER — THERAPY VISIT (OUTPATIENT)
Dept: PHYSICAL THERAPY | Facility: CLINIC | Age: 68
End: 2023-07-13
Payer: COMMERCIAL

## 2023-07-13 DIAGNOSIS — M79.675 PAIN OF TOE OF LEFT FOOT: ICD-10-CM

## 2023-07-13 PROCEDURE — 97161 PT EVAL LOW COMPLEX 20 MIN: CPT | Mod: GP | Performed by: PHYSICAL THERAPIST

## 2023-07-13 PROCEDURE — 97110 THERAPEUTIC EXERCISES: CPT | Mod: GP | Performed by: PHYSICAL THERAPIST

## 2023-07-13 PROCEDURE — 97140 MANUAL THERAPY 1/> REGIONS: CPT | Mod: GP | Performed by: PHYSICAL THERAPIST

## 2023-07-13 NOTE — PROGRESS NOTES
PHYSICAL THERAPY EVALUATION  Type of Visit: Evaluation    See electronic medical record for Abuse and Falls Screening details.    Subjective      Presenting condition or subjective complaint: need to figure out how to walk etc with fused big toe, surgery on 5/10/23  Date of onset: 05/10/23    Relevant medical history: Arthritis; Depression; Incontinence; Menopause; Osteoarthritis; Overweight   Dates & types of surgery: left big toe fusion 5/10/69735; left ankle replacement 2017    Prior diagnostic imaging/testing results: X-ray two screws   Prior therapy history for the same diagnosis, illness or injury: No      Prior Level of Function   Transfers: Independent  Ambulation: Independent  ADL: Independent  IADL:     Living Environment  Social support: With a significant other or spouse   Type of home: 2-story   Stairs to enter the home: Yes       Ramp: No   Stairs inside the home: Yes       Help at home: None  Equipment owned: Walker; Crutches     Employment: No    Hobbies/Interests: gardening, reading and baking    Patient goals for therapy: walk faster    Pain assessment: See objective evaluation for additional pain details     Objective   FOOT/ANKLE EVALUATION  PAIN: Pain Level at Rest: 0/10  Pain is Exacerbated By: walking on level surfaces and stairs  Pain is Relieved By: cold and rest  INTEGUMENTARY (edema, incisions): 22cm forefoot swelling on the left and 21cm on the right  POSTURE:   GAIT:   Weightbearing Status: WBAT  Assistive Device(s): None  Gait Deviations: Push off decreased L  BALANCE/PROPRIOCEPTION: difficulty  with unsupported tandem stance with left leg behind  WEIGHT BEARING ALIGNMENT:   NON-WEIGHTBEARING ALIGNMENT:    ROM:   (Degrees) Left AROM Left PROM  Right AROM Right PROM   Ankle Dorsiflexion 5 5     Ankle Plantarflexion       Ankle Inversion       Ankle Eversion       Great Toe Flexion IP joint only, MTP fused         Great Toe Extension IP joint only, MTP fused      Pain:   End feel:      STRENGTH:   FLEXIBILITY: tightness in gastroc and soleus  SPECIAL TESTS:   FUNCTIONAL TESTS: squat limited by ankle dorsiflexion mobility  PALPATION: sore over scar tissue and area between first and second toes  JOINT MOBILITY:     Assessment & Plan   CLINICAL IMPRESSIONS   Medical Diagnosis: left first toe fusion    Treatment Diagnosis: left first toe fusion   Impression/Assessment: Patient is a 67 year old female with left first toe complaints.  The following significant findings have been identified: Pain, Decreased ROM/flexibility, Decreased joint mobility, Decreased strength, Impaired balance, Impaired gait and Decreased activity tolerance. These impairments interfere with their ability to perform self care tasks, recreational activities, household chores, household mobility and community mobility as compared to previous level of function.     Clinical Decision Making (Complexity):   Clinical Presentation: Stable/Uncomplicated  Clinical Presentation Rationale: based on medical and personal factors listed in PT evaluation  Clinical Decision Making (Complexity): Low complexity    PLAN OF CARE  Treatment Interventions:  Interventions: Manual Therapy, Neuromuscular Re-education, Therapeutic Activity, Therapeutic Exercise, Self-Care/Home Management    Long Term Goals     PT Goal 1  Goal Identifier: ambulating  Goal Description: patient able to walk on uneven surfaces wtihout toe pain for 2 miles  Rationale: to maximize safety and independence within the community;to maximize safety and independence within the home  Target Date: 10/10/23      Frequency of Treatment: once a week for one month then 2 times a month for 2 months  Duration of Treatment: 3 months    Recommended Referrals to Other Professionals:   Education Assessment:   Learner/Method: Patient;Listening;Reading;Demonstration;Pictures/Video;No Barriers to Learning    Risks and benefits of evaluation/treatment have been explained.    Patient/Family/caregiver agrees with Plan of Care.     Evaluation Time:     PT Eval, Low Complexity Minutes (54309): 12      Signing Clinician: Adia Magana PT      [facility]                                                                                   OUTPATIENT PHYSICAL THERAPY      PLAN OF TREATMENT FOR OUTPATIENT REHABILITATION   Patient's Last Name, First Name, Elizabeth Crespo YOB: 1955   Provider's Name   [facility]   Medical Record No.  [id number]     Onset Date: 05/10/23  Start of Care Date: 07/13/23     Medical Diagnosis:  left first toe fusion      PT Treatment Diagnosis:  left first toe fusion Plan of Treatment  Frequency/Duration: once a week for one month then 2 times a month for 2 months/ 3 months    Certification date from 07/13/23 to 10/10/23         See note for plan of treatment details and functional goals     Adia Magana, PT                         I CERTIFY THE NEED FOR THESE SERVICES FURNISHED UNDER        THIS PLAN OF TREATMENT AND WHILE UNDER MY CARE .             Physician Signature               Date    X_____________________________________________________                        Referring Provider:  Joseph Valencia      Initial Assessment  See Epic Evaluation- Start of Care Date: 07/13/23

## 2023-07-20 ENCOUNTER — THERAPY VISIT (OUTPATIENT)
Dept: PHYSICAL THERAPY | Facility: CLINIC | Age: 68
End: 2023-07-20
Payer: COMMERCIAL

## 2023-07-20 DIAGNOSIS — M79.675 PAIN OF TOE OF LEFT FOOT: Primary | ICD-10-CM

## 2023-07-20 DIAGNOSIS — M25.561 RIGHT KNEE PAIN, UNSPECIFIED CHRONICITY: ICD-10-CM

## 2023-07-20 PROCEDURE — 97140 MANUAL THERAPY 1/> REGIONS: CPT | Mod: GP | Performed by: PHYSICAL THERAPIST

## 2023-07-20 PROCEDURE — 97110 THERAPEUTIC EXERCISES: CPT | Mod: GP | Performed by: PHYSICAL THERAPIST

## 2023-08-03 ENCOUNTER — THERAPY VISIT (OUTPATIENT)
Dept: PHYSICAL THERAPY | Facility: CLINIC | Age: 68
End: 2023-08-03
Payer: COMMERCIAL

## 2023-08-03 DIAGNOSIS — M79.675 PAIN OF TOE OF LEFT FOOT: Primary | ICD-10-CM

## 2023-08-03 DIAGNOSIS — M25.561 RIGHT KNEE PAIN, UNSPECIFIED CHRONICITY: ICD-10-CM

## 2023-08-03 PROCEDURE — 97140 MANUAL THERAPY 1/> REGIONS: CPT | Mod: GP | Performed by: PHYSICAL THERAPIST

## 2023-08-03 PROCEDURE — 97110 THERAPEUTIC EXERCISES: CPT | Mod: GP | Performed by: PHYSICAL THERAPIST

## 2023-08-03 NOTE — PROGRESS NOTES
[facility]                                                                                   OUTPATIENT PHYSICAL THERAPY    PLAN OF TREATMENT FOR OUTPATIENT REHABILITATION   Patient's Last Name, First Name, Elizabeth Crespo YOB: 1955   Provider's Name   [facility]   Medical Record No.  [id number]     Onset Date: 04/18/23 (date of MD visit)  Start of Care Date: 04/27/23     Medical Diagnosis:  right knee pain      PT Treatment Diagnosis:  right knee pain Plan of Treatment  Frequency/Duration: 2 times a month/ 12 weeks    Certification date from 07/26/23 to 10/23/23         See note for plan of treatment details and functional goals     Adia Magana, PT                         I CERTIFY THE NEED FOR THESE SERVICES FURNISHED UNDER        THIS PLAN OF TREATMENT AND WHILE UNDER MY CARE .             Physician Signature               Date    X_____________________________________________________                    Referring Provider:  Adrianna Galan MD   08/03/23 0500   Appointment Info   Signing clinician's name / credentials Adia Richards,ATC   Total/Authorized Visits 6   Visits Used 6   Medical Diagnosis right knee pain   PT Tx Diagnosis right knee pain   Quick [employer]ds Certification   Progress Note/Certification   Start of Care Date 04/27/23   Onset of illness/injury or Date of Surgery 04/18/23  (date of MD visit)   Therapy Frequency 2 times a month   Predicted Duration 12 weeks   Certification date from 07/26/23   Certification date to 10/23/23   Progress Note Completed Date 08/03/23   GOALS   PT Goals 2   PT Goal 1   Goal Identifier stairs   Goal Description Descend stairs; in a normal reciprocal pattern; with railing   Rationale to maximize safety and independence with performance of ADLs and functional tasks;to maximize safety and independence within the community;to maximize safety and independence within the home   Goal  Progress No knee pain today   Target Date 07/25/23   Date Met 08/03/23   PT Goal 2   Goal Identifier ambulation   Goal Description able to ambulate for 2 miles on uneven surfaces without knee pain   Rationale to maximize safety and independence within the community   Target Date 10/23/23   Subjective Report   Subjective Report Went dancing at a wedding and had some left ankle pain.  No issues with the toes.  Still wearing compression socks.  Right knee got aggravated a bit with the dancing as well. Was wearing some new sandals   Objective Measures   Objective Measures Objective Measure 1   Objective Measure 1   Objective Measure stairs   Details No pain with stepping up onto 7 inch step today   Objective Measure 2   Objective Measure swelling   Details moderate joint line swelling   Treatment Interventions (PT)   Interventions Therapeutic Procedure/Exercise;Manual Therapy;Neuromuscular Re-education   Therapeutic Procedure/Exercise   Therapeutic Procedures: strength, endurance, ROM, flexibillity minutes (27172) 15   PTRx Ther Proc 1 Seated Hamstring Curl with Tubing   PTRx Ther Proc 1 - Details 20 reps blue tubing   PTRx Ther Proc 2 Prone Knee Flexion AAROM   PTRx Ther Proc 2 - Details HEP   PTRx Ther Proc 7 Supine Heel Slides   PTRx Ther Proc 7 - Details 10 reps   PTRx Ther Proc 8 Short Arc Knee Extension   PTRx Ther Proc 8 - Details #2x20 repsdo this in the morning without the weight   PTRx Ther Proc 10 Patella Mobilization Medial   PTRx Ther Proc 10 - Details HEP   Skilled Intervention verbal cues for positioning   Patient Response/Progress understanding   Manual Therapy   Manual Therapy: Mobilization, MFR, MLD, friction massage minutes (26972) 23   Manual Therapy Manual Therapy 2;Manual Therapy 3   Manual Therapy 1 right knee STM for swelling,   Manual Therapy 1 - Details 10 min   Manual Therapy 2 patellar mobilization   Manual Therapy 2 - Details 2 min, inferior and medial glide   Manual Therapy 3 left first toe  scar tissue   Manual Therapy 3 - Details 11   Skilled Intervention to help with joint swelling and mobility   Patient Response/Progress decreased swelling noted   Education   Learner/Method Patient;Listening;Demonstration;Pictures/Video   Plan   Home program progress strengthenging and ROM   Updates to plan of care see PTRX   Plan for next session continue to progress strengthenin as tolerated   Total Session Time   Timed Code Treatment Minutes 38   Total Treatment Time (sum of timed and untimed services) 38           Initial Assessment  See Epic Evaluation- Start of Care Date: 04/27/23

## 2023-08-10 ENCOUNTER — THERAPY VISIT (OUTPATIENT)
Dept: PHYSICAL THERAPY | Facility: CLINIC | Age: 68
End: 2023-08-10
Payer: COMMERCIAL

## 2023-08-10 DIAGNOSIS — M99.05 SOMATIC DYSFUNCTION OF PELVIC REGION: ICD-10-CM

## 2023-08-10 DIAGNOSIS — N39.46 MIXED INCONTINENCE URGE AND STRESS (MALE)(FEMALE): Primary | ICD-10-CM

## 2023-08-10 PROCEDURE — 97140 MANUAL THERAPY 1/> REGIONS: CPT | Mod: GP | Performed by: PHYSICAL THERAPIST

## 2023-08-10 PROCEDURE — 97530 THERAPEUTIC ACTIVITIES: CPT | Mod: GP | Performed by: PHYSICAL THERAPIST

## 2023-08-10 PROCEDURE — 97161 PT EVAL LOW COMPLEX 20 MIN: CPT | Mod: GP | Performed by: PHYSICAL THERAPIST

## 2023-08-10 PROCEDURE — 97110 THERAPEUTIC EXERCISES: CPT | Mod: GP | Performed by: PHYSICAL THERAPIST

## 2023-08-10 NOTE — PROGRESS NOTES
PHYSICAL THERAPY EVALUATION  Type of Visit: Evaluation    See electronic medical record for Abuse and Falls Screening details.    Subjective       Presenting condition or subjective complaint: urinary leakage/incontinence began 5 years ago  Date of onset: 07/23/23 (date patient saw MD)    Relevant medical history: Arthritis; Bladder or bowel problems; Depression; Incontinence; Menopause; Overweight   Dates & types of surgery: left big toe fusion 5/10/00281; left ankle replacement 2017    Prior diagnostic imaging/testing results: X-ray two screws   Prior therapy history for the same diagnosis, illness or injury: Yes 8/22    Prior Level of Function  Transfers: Independent  Ambulation: Independent  ADL: Independent  IADL:     Living Environment  Social support: With a significant other or spouse   Type of home: 2-story   Stairs to enter the home: Yes       Ramp: No   Stairs inside the home: Yes       Help at home: None  Equipment owned: Walker; Crutches     Employment: No    Hobbies/Interests: gardening, reading and baking    Patient goals for therapy: not rush to the bathroom- not pee in my pants    Pain assessment: See objective evaluation for additional pain details     Objective      PELVIC EVALUATION  ADDITIONAL HISTORY:  Sex assigned at birth: Female  Gender identity: Female    Pronouns: She/Her Hers      Bladder History:  Feels bladder filling: No  Triggers for feeling of inability to wait to go to the bathroom: Yes getting close to the bathroom  How long can you wait to urinate: sometimes I  can't  Gets up at night to urinate: Yes 2  Can stop the flow of urine when urinating: Yes  Volume of urine usually released: Average   Other issues:    Number of bladder infections in last 12 months:    Fluid intake per day: 32 24    Medications taken for bladder: No (hasn't helped)     Activities causing urine leak: Hurrying to the bathroom due to a strong urge to urinate (pee); Cough; Sneeze    Amount of urine typically  leaked: not sure- sometimes more and sometimes less  Pads used to help with leaking: Yes I use this many pads per day: depends      Bowel History:  Frequency of bowel movement: about 3-4 days  Consistency of stool: Hard    Ignores the urge to defecate: No  Other bowel issues: Straining to have bowel movement; Loss of gas  Length of time spent trying to have a bowel movement: push periodically    Sexual Function History:  Sexual orientation: Straight    Sexually active: No  Lubrication used:      Pelvic pain:      Pain or difficulty with orgasms/erection/ejaculation: No    State of menopause: Post-menopause (I am done with menopause)  Hormone medications: Yes estradiaol, progesterone    Are you currently pregnant: No, Number of previous pregnancies: 0, Have you been diagnosed with pelvic prolapse or abdominal separation: No, Do you get regular exercise: Yes, I do this type of exercise: yoga, walking, Have you tried pelvic floor strengthening exercises for 4 weeks: Yes, Do you have any history of trauma that is relevant to your care that you d like to share: No    Discussed reason for referral regarding pelvic health needs and external/internal pelvic floor muscle examination with patient/guardian.  Opportunity provided to ask questions and verbal consent for assessment and intervention was given.    PAIN: Pain Level at Rest: 0/10  LUMBAR SCREEN: AROM WNL  HIP SCREEN:  Strength:  4/5 hip abduction          PELVIC EXAM  External Visual Inspection:  At rest: Distended perineal body  With voluntary pelvic floor contraction: Perineal elevation  Relaxation of PFM: Yes  With intra-abdominal pressure: Bearing down as defecation: Perineal descent, descending bladder    Integumentary:   Introitus: Tight    External Digital Palpation per Perineum:   Ischiocavernosis: Tightness  Bulbo cavernosis: Tightness      Internal Digital Palpation:  Per Vagina:  Tenderness over the levator ani muscles  Fatigue with longer pelvic muscle  contraction    Pelvic Organ Prolapse:   Anterior: with bearing down    BIOFEEDBACK:  Position: Supine  Surface Electrodes: Perineal        Perianals:   Baseline muscle activity: 2 microvolts  Endurance Hold-Average mean amplitude/work average: 3 seconds    DERMATOMES: WNL  DTR S:     Assessment & Plan   CLINICAL IMPRESSIONS  Medical Diagnosis: mixed incontinence    Treatment Diagnosis: mixed incontinence   Impression/Assessment: Patient is a 67 year old female with mixed incontinence complaints.  The following significant findings have been identified: Decreased ROM/flexibility, Decreased strength, Impaired muscle performance, and Decreased activity tolerance. These impairments interfere with their ability to perform self care tasks and household chores as compared to previous level of function.     Clinical Decision Making (Complexity):  Clinical Presentation: Stable/Uncomplicated  Clinical Presentation Rationale: based on medical and personal factors listed in PT evaluation  Clinical Decision Making (Complexity): Low complexity    PLAN OF CARE  Treatment Interventions:  Modalities: Biofeedback  Interventions: Manual Therapy, Neuromuscular Re-education, Therapeutic Activity, Therapeutic Exercise    Long Term Goals     PT Goal 1  Goal Identifier: urge incontinence  Goal Description: patient able to walk from the car to the bathroom in the house without leaking urine  Rationale: to maximize safety and independence with performance of ADLs and functional tasks  Target Date: 11/07/23      Frequency of Treatment: once a week for 4 weeks then every other week for 2 month  Duration of Treatment: 8 visits over 3 month    Recommended Referrals to Other Professionals:   Education Assessment:   Learner/Method: Patient;Listening;Reading;Demonstration;Pictures/Video;No Barriers to Learning    Risks and benefits of evaluation/treatment have been explained.   Patient/Family/caregiver agrees with Plan of Care.     Evaluation Time:      PT Kai, Low Complexity Minutes (97416): 20       Signing Clinician: NAJMA [facility]                                                                                   OUTPATIENT PHYSICAL THERAPY      PLAN OF TREATMENT FOR OUTPATIENT REHABILITATION   Patient's Last Name, First Name, Elizabeth Crespo YOB: 1955   Provider's Name   [facility]   Medical Record No.  [id number]     Onset Date: 07/23/23 (date patient saw MD)  Start of Care Date: 08/10/23     Medical Diagnosis:  mixed incontinence      PT Treatment Diagnosis:  mixed incontinence Plan of Treatment  Frequency/Duration: once a week for 4 weeks then every other week for 2 month/ 8 visits over 3 month    Certification date from 08/10/23 to 11/07/23         See note for plan of treatment details and functional goals     Adia Magana, NAJMA                         I CERTIFY THE NEED FOR THESE SERVICES FURNISHED UNDER        THIS PLAN OF TREATMENT AND WHILE UNDER MY CARE .             Physician Signature               Date    X_____________________________________________________                    Referring Provider:  Adrianna Galan      Initial Assessment  See Epic Evaluation- Start of Care Date: 08/10/23

## 2023-08-17 ENCOUNTER — THERAPY VISIT (OUTPATIENT)
Dept: PHYSICAL THERAPY | Facility: CLINIC | Age: 68
End: 2023-08-17
Payer: COMMERCIAL

## 2023-08-17 DIAGNOSIS — M99.05 SOMATIC DYSFUNCTION OF PELVIC REGION: ICD-10-CM

## 2023-08-17 DIAGNOSIS — N39.46 MIXED INCONTINENCE URGE AND STRESS (MALE)(FEMALE): Primary | ICD-10-CM

## 2023-08-17 PROCEDURE — 90912 BFB TRAINING 1ST 15 MIN: CPT | Mod: GP | Performed by: PHYSICAL THERAPIST

## 2023-08-17 PROCEDURE — 97140 MANUAL THERAPY 1/> REGIONS: CPT | Mod: GP | Performed by: PHYSICAL THERAPIST

## 2023-08-18 ENCOUNTER — THERAPY VISIT (OUTPATIENT)
Dept: PHYSICAL THERAPY | Facility: CLINIC | Age: 68
End: 2023-08-18
Payer: COMMERCIAL

## 2023-08-18 DIAGNOSIS — M25.561 RIGHT KNEE PAIN, UNSPECIFIED CHRONICITY: Primary | ICD-10-CM

## 2023-08-18 PROCEDURE — 97112 NEUROMUSCULAR REEDUCATION: CPT | Mod: GP | Performed by: PHYSICAL THERAPIST

## 2023-08-18 PROCEDURE — 97110 THERAPEUTIC EXERCISES: CPT | Mod: GP | Performed by: PHYSICAL THERAPIST

## 2023-08-18 PROCEDURE — 97530 THERAPEUTIC ACTIVITIES: CPT | Mod: GP | Performed by: PHYSICAL THERAPIST

## 2023-08-23 ENCOUNTER — THERAPY VISIT (OUTPATIENT)
Dept: PHYSICAL THERAPY | Facility: CLINIC | Age: 68
End: 2023-08-23
Payer: COMMERCIAL

## 2023-08-23 DIAGNOSIS — M99.05 SOMATIC DYSFUNCTION OF PELVIC REGION: ICD-10-CM

## 2023-08-23 DIAGNOSIS — N39.46 MIXED INCONTINENCE URGE AND STRESS (MALE)(FEMALE): Primary | ICD-10-CM

## 2023-08-23 PROCEDURE — 90912 BFB TRAINING 1ST 15 MIN: CPT | Mod: GP | Performed by: PHYSICAL THERAPIST

## 2023-08-23 PROCEDURE — 97112 NEUROMUSCULAR REEDUCATION: CPT | Mod: GP | Performed by: PHYSICAL THERAPIST

## 2023-08-25 ENCOUNTER — THERAPY VISIT (OUTPATIENT)
Dept: PHYSICAL THERAPY | Facility: CLINIC | Age: 68
End: 2023-08-25
Payer: COMMERCIAL

## 2023-08-25 DIAGNOSIS — M25.561 RIGHT KNEE PAIN, UNSPECIFIED CHRONICITY: Primary | ICD-10-CM

## 2023-08-25 PROCEDURE — 97530 THERAPEUTIC ACTIVITIES: CPT | Mod: GP | Performed by: PHYSICAL THERAPIST

## 2023-08-25 PROCEDURE — 97110 THERAPEUTIC EXERCISES: CPT | Mod: GP | Performed by: PHYSICAL THERAPIST

## 2023-08-25 PROCEDURE — 97140 MANUAL THERAPY 1/> REGIONS: CPT | Mod: GP | Performed by: PHYSICAL THERAPIST

## 2023-10-12 ENCOUNTER — THERAPY VISIT (OUTPATIENT)
Dept: PHYSICAL THERAPY | Facility: CLINIC | Age: 68
End: 2023-10-12
Payer: COMMERCIAL

## 2023-10-12 DIAGNOSIS — M25.561 RIGHT KNEE PAIN, UNSPECIFIED CHRONICITY: Primary | ICD-10-CM

## 2023-10-12 PROCEDURE — 97110 THERAPEUTIC EXERCISES: CPT | Mod: GP | Performed by: PHYSICAL THERAPIST

## 2023-10-12 PROCEDURE — 97140 MANUAL THERAPY 1/> REGIONS: CPT | Mod: GP | Performed by: PHYSICAL THERAPIST

## 2023-10-12 NOTE — PROGRESS NOTES
DISCHARGE  Reason for Discharge: Patient has met all goals.    Equipment Issued:     Discharge Plan: Patient to continue home program.   10/12/23 0500   Appointment Info   Signing clinician's name / credentials Adia Richards ATC   Total/Authorized Visits 6   Visits Used 8   Medical Diagnosis right knee pain   PT Tx Diagnosis right knee pain   Quick Adds Certification   Progress Note/Certification   Start of Care Date 04/27/23   Onset of illness/injury or Date of Surgery 04/18/23  (date of MD visit)   Therapy Frequency 2 times a month   Predicted Duration 12 weeks   Certification date from 07/26/23   Certification date to 10/23/23   Progress Note Completed Date 10/12/23   GOALS   PT Goals 2   PT Goal 1   Goal Identifier stairs   Goal Description Descend stairs; in a normal reciprocal pattern; with railing   Rationale to maximize safety and independence with performance of ADLs and functional tasks;to maximize safety and independence within the community;to maximize safety and independence within the home   Goal Progress Has had a flare up of pain with descending stairs   Target Date 09/25/23   Date Met 10/12/23   PT Goal 2   Goal Identifier ambulation   Goal Description able to ambulate for 2 miles on uneven surfaces without knee pain   Rationale to maximize safety and independence within the community   Goal Progress walked in Altra shoes over [place] and did not have issues.   Target Date 10/23/23   Date Met 10/12/23   Subjective Report   Subjective Report Did well on the trip abroad.  Walked a lot and wore Altra shoes.  Hit left toe on a threshold and went down on left lower leg.  Had swelling right away. Also hit right knee at home on a ladder   Objective Measures   Objective Measures Objective Measure 1   Objective Measure 1   Objective Measure stairs   Details not assessed today at PT but patient states no pain at home with this now   Objective Measure 2   Objective Measure swelling   Details swelling and  ecchymosis in the left lower leg and above the right patella   Treatment Interventions (PT)   Interventions Therapeutic Procedure/Exercise;Neuromuscular Re-education   Therapeutic Procedure/Exercise   Therapeutic Procedures: strength, endurance, ROM, flexibillity minutes (58851) 5   PTRx Ther Proc 1 Patella Mobilization Medial   PTRx Ther Proc 1 - Details 1 min   PTRx Ther Proc 2 Standing Hip Flexor Stretch   PTRx Ther Proc 2 - Details 20 sec   PTRx Ther Proc 3 Hip Flexion Straight Leg Raise   PTRx Ther Proc 3 - Details HEP   PTRx Ther Proc 4 Hip Abduction Straight Leg Raise   PTRx Ther Proc 4 - Details HEP   PTRx Ther Proc 5 Hip Extension Straight Leg Raise   PTRx Ther Proc 5 - Details HEP   PTRx Ther Proc 6 Prone Hip Extension With Bent Knee   PTRx Ther Proc 6 - Details HEP   PTRx Ther Proc 7 Lateral Stepdown with Neutral Trunk Position   PTRx Ther Proc 7 - Details HEP   PTRx Ther Proc 8 heel slides for ROM   PTRx Ther Proc 8 - Details 5 reps   Skilled Intervention verbal cues for positioning   Patient Response/Progress understanding   Neuromuscular Re-education   Neuromuscular re-ed of mvmt, balance, coord, kinesthetic sense, posture, proprioception minutes (76109) 5   PTRx Neuro Re-ed 1 Quad Set Without Towel Roll Under Knee   PTRx Neuro Re-ed 1 - Details 10 reps for 5 reps   PTRx Neuro Re-ed 2 Bridging Pelvic Floor    PTRx Neuro Re-ed 2 - Details 2x10 reps with ball squeeze   PTRx Neuro Re-ed 3 Balance Single Leg Stance Supported and Unsupported   PTRx Neuro Re-ed 3 - Details HEP   Manual Therapy   Manual Therapy: Mobilization, MFR, MLD, friction massage minutes (19617) 20   Manual Therapy 1 STM to lower leg   Manual Therapy 1 - Details 5 min for swelling   Manual Therapy 2 patellar mobilization   Manual Therapy 2 - Details STM as well over superior patella   Skilled Intervention to help with joint swelling and mobility   Patient Response/Progress decreased swelling noted   Education   Learner/Method  Patient;Listening;Demonstration;Pictures/Video   Plan   Home program progress strengthening and ROM   Updates to plan of care see PTRX   Plan for next session continue to progress strengthening as tolerated   Total Session Time   Timed Code Treatment Minutes 30   Total Treatment Time (sum of timed and untimed services) 30       Referring Provider:  Anay Pedraza

## 2023-11-28 ENCOUNTER — HOSPITAL ENCOUNTER (OUTPATIENT)
Dept: MAMMOGRAPHY | Facility: CLINIC | Age: 68
Discharge: HOME | End: 2023-11-28
Attending: FAMILY MEDICINE | Admitting: FAMILY MEDICINE
Payer: COMMERCIAL

## 2023-11-28 DIAGNOSIS — Z12.31 VISIT FOR SCREENING MAMMOGRAM: ICD-10-CM

## 2023-11-28 PROCEDURE — 77067 SCR MAMMO BI INCL CAD: CPT

## 2023-12-13 PROBLEM — M99.05 SOMATIC DYSFUNCTION OF PELVIC REGION: Status: RESOLVED | Noted: 2022-11-18 | Resolved: 2023-12-13

## 2024-01-30 ENCOUNTER — APPOINTMENT (OUTPATIENT)
Dept: CT IMAGING | Facility: CLINIC | Age: 69
End: 2024-01-30
Attending: EMERGENCY MEDICINE
Payer: COMMERCIAL

## 2024-01-30 ENCOUNTER — HOSPITAL ENCOUNTER (EMERGENCY)
Facility: CLINIC | Age: 69
Discharge: HOME | End: 2024-01-30
Attending: EMERGENCY MEDICINE | Admitting: EMERGENCY MEDICINE
Payer: COMMERCIAL

## 2024-01-30 VITALS
OXYGEN SATURATION: 98 % | BODY MASS INDEX: 30.67 KG/M2 | HEART RATE: 71 BPM | DIASTOLIC BLOOD PRESSURE: 84 MMHG | WEIGHT: 190 LBS | TEMPERATURE: 98.7 F | RESPIRATION RATE: 18 BRPM | SYSTOLIC BLOOD PRESSURE: 151 MMHG

## 2024-01-30 DIAGNOSIS — I26.99 ACUTE PULMONARY EMBOLISM WITHOUT ACUTE COR PULMONALE, UNSPECIFIED PULMONARY EMBOLISM TYPE (H): ICD-10-CM

## 2024-01-30 LAB
ALBUMIN SERPL BCG-MCNC: 4.2 G/DL (ref 3.5–5.2)
ALP SERPL-CCNC: ABNORMAL U/L
ALT SERPL W P-5'-P-CCNC: ABNORMAL U/L
ANION GAP SERPL CALCULATED.3IONS-SCNC: 7 MMOL/L (ref 7–15)
AST SERPL W P-5'-P-CCNC: ABNORMAL U/L
BASOPHILS # BLD AUTO: 0.1 10E3/UL (ref 0–0.2)
BASOPHILS NFR BLD AUTO: 1 %
BILIRUB SERPL-MCNC: 0.4 MG/DL
BUN SERPL-MCNC: 12.5 MG/DL (ref 8–23)
CALCIUM SERPL-MCNC: 9 MG/DL (ref 8.8–10.2)
CHLORIDE SERPL-SCNC: 102 MMOL/L (ref 98–107)
CREAT SERPL-MCNC: 0.71 MG/DL (ref 0.51–0.95)
DEPRECATED HCO3 PLAS-SCNC: 27 MMOL/L (ref 22–29)
EGFRCR SERPLBLD CKD-EPI 2021: >90 ML/MIN/1.73M2
EOSINOPHIL # BLD AUTO: 0.2 10E3/UL (ref 0–0.7)
EOSINOPHIL NFR BLD AUTO: 2 %
ERYTHROCYTE [DISTWIDTH] IN BLOOD BY AUTOMATED COUNT: 12.6 % (ref 10–15)
GLUCOSE SERPL-MCNC: 95 MG/DL (ref 70–99)
HCT VFR BLD AUTO: 41.9 % (ref 35–47)
HGB BLD-MCNC: 14.1 G/DL (ref 11.7–15.7)
HOLD SPECIMEN: NORMAL
HOLD SPECIMEN: NORMAL
IMM GRANULOCYTES # BLD: 0 10E3/UL
IMM GRANULOCYTES NFR BLD: 0 %
LYMPHOCYTES # BLD AUTO: 1.4 10E3/UL (ref 0.8–5.3)
LYMPHOCYTES NFR BLD AUTO: 19 %
MCH RBC QN AUTO: 30.6 PG (ref 26.5–33)
MCHC RBC AUTO-ENTMCNC: 33.7 G/DL (ref 31.5–36.5)
MCV RBC AUTO: 91 FL (ref 78–100)
MONOCYTES # BLD AUTO: 0.5 10E3/UL (ref 0–1.3)
MONOCYTES NFR BLD AUTO: 7 %
NEUTROPHILS # BLD AUTO: 5.2 10E3/UL (ref 1.6–8.3)
NEUTROPHILS NFR BLD AUTO: 71 %
NRBC # BLD AUTO: 0 10E3/UL
NRBC BLD AUTO-RTO: 0 /100
NT-PROBNP SERPL-MCNC: <36 PG/ML (ref 0–900)
PLATELET # BLD AUTO: 254 10E3/UL (ref 150–450)
POTASSIUM SERPL-SCNC: 6.6 MMOL/L (ref 3.4–5.3)
PROT SERPL-MCNC: ABNORMAL G/DL
RBC # BLD AUTO: 4.61 10E6/UL (ref 3.8–5.2)
SODIUM SERPL-SCNC: 136 MMOL/L (ref 135–145)
WBC # BLD AUTO: 7.4 10E3/UL (ref 4–11)

## 2024-01-30 PROCEDURE — 99285 EMERGENCY DEPT VISIT HI MDM: CPT | Mod: 25

## 2024-01-30 PROCEDURE — 85025 COMPLETE CBC W/AUTO DIFF WBC: CPT | Performed by: EMERGENCY MEDICINE

## 2024-01-30 PROCEDURE — 250N000013 HC RX MED GY IP 250 OP 250 PS 637: Performed by: EMERGENCY MEDICINE

## 2024-01-30 PROCEDURE — 82040 ASSAY OF SERUM ALBUMIN: CPT | Performed by: EMERGENCY MEDICINE

## 2024-01-30 PROCEDURE — 250N000011 HC RX IP 250 OP 636: Performed by: EMERGENCY MEDICINE

## 2024-01-30 PROCEDURE — 71275 CT ANGIOGRAPHY CHEST: CPT

## 2024-01-30 PROCEDURE — 83880 ASSAY OF NATRIURETIC PEPTIDE: CPT | Performed by: EMERGENCY MEDICINE

## 2024-01-30 PROCEDURE — 250N000009 HC RX 250: Performed by: EMERGENCY MEDICINE

## 2024-01-30 PROCEDURE — 36415 COLL VENOUS BLD VENIPUNCTURE: CPT | Performed by: EMERGENCY MEDICINE

## 2024-01-30 RX ORDER — IOPAMIDOL 755 MG/ML
70 INJECTION, SOLUTION INTRAVASCULAR ONCE
Status: COMPLETED | OUTPATIENT
Start: 2024-01-30 | End: 2024-01-30

## 2024-01-30 RX ADMIN — SODIUM CHLORIDE 94 ML: 9 INJECTION, SOLUTION INTRAVENOUS at 13:49

## 2024-01-30 RX ADMIN — IOPAMIDOL 70 ML: 755 INJECTION, SOLUTION INTRAVENOUS at 13:49

## 2024-01-30 RX ADMIN — RIVAROXABAN 15 MG: 15 TABLET, FILM COATED ORAL at 14:47

## 2024-01-30 ASSESSMENT — ACTIVITIES OF DAILY LIVING (ADL): ADLS_ACUITY_SCORE: 35

## 2024-01-30 NOTE — DISCHARGE INSTRUCTIONS
As discussed, take the prescribed anticoagulants twice a day for the first 3 weeks and then once a day afterward.  It is important you follow-up with hematology regarding a further workup for this clot.  Be sure to come into the hospital if you develop any significant bleeding or suffer any trauma.

## 2024-01-30 NOTE — ED PROVIDER NOTES
History     Chief Complaint:  Shortness of Breath    The history is provided by the patient.     Elizabeth Cuellar is a 68 year old female with history of hyperlipidemia and thyroid disease presenting for evaluation of shortness of breath. Elizabeth explains that a d-dimer drawn yesterday at a pre-op appointment for overactive bladder returned elevated today, stating that it was drawn because she had mentioned some increasing shortness of breath recently. She also notes some intermittent stabbing chest pain and intermittent pain in her right lower calf for the last several weeks.     Independent Historian:   None - Patient Only    Review of External Notes:   Yes-I reviewed the patient's notes from her visit to her primary team yesterday which included an EKG and a full panel of labs including a D-dimer elevated at 1.07.    Medications:    Albuterol  Atarax  Levothyroxine  Ditropan  Prometrium  Crestor  Pristiq  Estradiol  Vistaril  Prometrium  Zofran  Cymbalta  Zocor  Glucosamine    Past Medical History:    Arthritis  Hyperlipidemia  Overactive bladder  Plantar fasciitis  Thyroid disease  Adjustment disorder with depressed mood    Past Surgical History:    Arthroplasty ankle  Colonoscopy  Dilation and curettage, operative hysteroscopy with morcellator, combined  Lasik  Left ankle repair post fracture  Right great toe surgery  Remove hardware ankle, left  [place] teeth extraction    Physical Exam   Patient Vitals for the past 24 hrs:   BP Temp Temp src Pulse Resp SpO2 Weight   01/30/24 1430 (!) 151/84 -- -- 74 20 99 % --   01/30/24 1420 -- -- -- -- -- 99 % --   01/30/24 1419 (!) 143/93 -- -- 72 -- -- --   01/30/24 1314 (!) 176/78 98.7  F (37.1  C) Temporal 80 21 98 % 86.2 kg (190 lb)     Physical Exam  Eye:  Pupils are equal, round, and reactive.  Extraocular movements intact.    ENT:  No rhinorrhea.  Moist mucus membranes.  Normal tongue and tonsil.    Cardiac:  Regular rate and rhythm.  No murmurs, gallops, or  rubs.    Pulmonary:  Clear to auscultation bilaterally.  No wheezes, rales, or rhonchi.    Abdomen:  Positive bowel sounds.  Abdomen is soft and non-distended, without focal tenderness.    Musculoskeletal:  Normal movement of all extremities without evidence for deficit.    Skin:  Warm and dry without rashes.    Neurologic:  Non-focal exam without asymmetric weakness or numbness.     Psychiatric:  Normal affect with appropriate interaction with examiner.    Emergency Department Course   Imaging:  CT Chest Pulmonary Embolism w Contrast   Preliminary Result   IMPRESSION:  Small burden right upper lobe pulmonary emboli.        Laboratory:  Labs Ordered and Resulted from Time of ED Arrival to Time of ED Departure   COMPREHENSIVE METABOLIC PANEL - Abnormal       Result Value    Sodium 136      Potassium 6.6 (*)     Carbon Dioxide (CO2) 27      Anion Gap 7      Urea Nitrogen 12.5      Creatinine 0.71      GFR Estimate >90      Calcium 9.0      Chloride 102      Glucose 95      Alkaline Phosphatase        AST        ALT        Protein Total        Albumin 4.2      Bilirubin Total 0.4     NT PROBNP INPATIENT - Normal    N terminal Pro BNP Inpatient <36     CBC WITH PLATELETS AND DIFFERENTIAL    WBC Count 7.4      RBC Count 4.61      Hemoglobin 14.1      Hematocrit 41.9      MCV 91      MCH 30.6      MCHC 33.7      RDW 12.6      Platelet Count 254      % Neutrophils 71      % Lymphocytes 19      % Monocytes 7      % Eosinophils 2      % Basophils 1      % Immature Granulocytes 0      NRBCs per 100 WBC 0      Absolute Neutrophils 5.2      Absolute Lymphocytes 1.4      Absolute Monocytes 0.5      Absolute Eosinophils 0.2      Absolute Basophils 0.1      Absolute Immature Granulocytes 0.0      Absolute NRBCs 0.0     TROPONIN T, HIGH SENSITIVITY   POTASSIUM     Emergency Department Course & Assessments:       Interventions:  Medications   rivaroxaban ANTICOAGULANT (XARELTO) tablet 15 mg (has no administration in time range)    iopamidol (ISOVUE-370) solution 70 mL (70 mLs Intravenous $Given 1/30/24 134)   Saline (94 mLs Intravenous $Given 1/30/24 1349)     Assessments:  1322 I obtained history and examined the patient as noted above.  1427 I rechecked the patient. I discussed findings and discharge with the patient. All questions answered.     Independent Interpretation (X-rays, CTs, rhythm strip):  None    Consultations/Discussion of Management or Tests:  None        Social Determinants of Health affecting care:   None    Disposition:  The patient was discharged.     Impression & Plan    Medical Decision Making:  This delightful 68-year-old presents to us with complaints of having several weeks of intermittent right-sided calf pain and also noting some generalized shortness of breath with exertion.  She was seen by her primary team yesterday for a preop physical and was found to have an elevated D-dimer at 1.07 with an otherwise reassuring assessment.  She was sent here for CT of the chest.  This was performed which unfortunately does show evidence of a small burden PE in the right upper lobe.  Otherwise, the patient has a negative BNP.  She has no ongoing chest pain and I do not believe that she requires a troponin.  She did have other chemistries obtained, though with a hemolyzed sample, she had an elevated potassium.  Considering her potassium yesterday was 4.7, I believe that this is a spurious value and does not need to be repeated.    I went over the pathology of pulmonary embolism at length with the patient.  We discussed starting rivaroxaban and the importance of outpatient follow-up with hematology.  I placed a  referral for this.  She will be in communication with her urologist about her upcoming surgery which I anticipate will likely be postponed for a bit.  Otherwise, her questions were answered and she is comfort with the plan for discharge.  She will return to us for increasing shortness of breath, pain,  syncope, fever, or any other emergent concerns.    Diagnosis:    ICD-10-CM    1. Acute pulmonary embolism without acute cor pulmonale, unspecified pulmonary embolism type (H)  I26.99 Adult Oncology/Hematology  Referral        Discharge Medications:  New Prescriptions    RIVAROXABAN ANTICOAGULANT 15 & 20 MG TBPK STARTER THERAPY PACK    Take 15 mg by mouth 2 times daily (with meals) for 21 days, THEN 20 mg daily with food for 9 days.     Scribe Disclosure:  I, Leydi Bright, am serving as a scribe at 2:26 PM on 1/30/2024 to document services personally performed by Trierweiler, Chad A, MD based on my observations and the provider's statements to me.   1/30/2024   Trierweiler, Chad A, MD Trierweiler, Chad A, MD  01/30/24 1939

## 2024-01-30 NOTE — ED TRIAGE NOTES
Pt presents to ed to be evaluated for shortness of breath.  Pt was at a pre op apt yesterday and mentioned she's been sob for a week.   Today her d-dimer came back at 1.07 and was sent here.   Pt also reports that she has had intermittent pain in her right calf, and intermittent chest pain.  Pt denies chest pain currently.      Triage Assessment (Adult)       Row Name 01/30/24 1312          Triage Assessment    Airway WDL WDL        Respiratory WDL    Rhythm/Pattern, Respiratory shortness of breath         Cardiac WDL    Cardiac WDL WDL        Cognitive/Neuro/Behavioral WDL    Cognitive/Neuro/Behavioral WDL WDL

## 2024-01-30 NOTE — ED NOTES
PIT/Triage Evaluation    Patient presented with shortness of breath. Elizabeth explains that she was notified that a d-dimer drawn yesterday after mentioning at a pre-op appointment for overactive bladder yesterday that she has been experiencing some increasing shortness of breath returned elevated today. She notes some intermittent stabbing chest pain and intermittent pain in her right lower calf for the last several weeks.    Exam is notable for:    Patient Vitals for the past 24 hrs:   BP Temp Temp src Pulse Resp SpO2 Weight   01/30/24 1314 (!) 176/78 98.7  F (37.1  C) Temporal 80 21 98 % 86.2 kg (190 lb)     General:  Alert, interactive  Cardiovascular:  Well perfused  Lungs:  No respiratory distress, no accessory muscle use  Neuro:  Moving all 4 extremities  Skin:  Warm, dry  Psych:  Normal affect    Appropriate interventions for symptom management were initiated if applicable.  Appropriate diagnostic tests were initiated if indicated.    Important information for subsequent clinician:  Elderly woman with preop physical showing an elevated D-dimer in the setting of generalized shortness of breath and mild calf pain.  Vitals are reassuring.  AC line being placed and CT ordered along with further workup for acute myocardial infarction and heart failure.    I briefly evaluated the patient and developed an initial plan of care. I discussed this plan and explained that this brief interaction does not constitute a full evaluation. Patient/family understands that they should wait to be fully evaluated and discuss any test results with another clinician prior to leaving the hospital.    Scribe Disclosure:  I, Leydi Bright, am serving as a scribe at 1:15 PM on 1/30/2024 to document services personally performed by Trierweiler, Chad A, MD based on my observations and the provider's statements to me.     Trierweiler, Chad A, MD  01/30/24 [id number]

## 2024-01-30 NOTE — ED NOTES
MD notified of hemolyzed green top - okay to not send redraw for now per Dr. Trierweiler. MD with patient at this time.

## 2024-02-15 ENCOUNTER — TELEPHONE (OUTPATIENT)
Dept: HEMATOLOGY | Facility: CLINIC | Age: 69
End: 2024-02-15
Payer: COMMERCIAL

## 2024-02-15 DIAGNOSIS — M79.661 PAIN OF RIGHT LOWER LEG: ICD-10-CM

## 2024-02-15 DIAGNOSIS — I26.99 PULMONARY EMBOLISM (H): Primary | ICD-10-CM

## 2024-02-15 NOTE — TELEPHONE ENCOUNTER
[id number]  Elizabeth Cuellar  68 year old female  [facility] Diagnosis: PE 1/30/24  [facility] Provider: CAROLYN Sifuentes    Patient calling in today to get a general sense of items discuss at upcoming NEW patient appointment. She had a PE 1/30 (she takes HRT for bad hot flashes and is still taking)  She also has elective bladder surgery not yet scheduled. I told that we typically ask patients to schedule elective surgeries after 3 months of anticoagulation after a blood clot. She also mentioned intermitant right leg pain. An US was not done at time of CT chest. CAROLYN Storey would like her to get an US prior to visit if possible.  She is on Xarelto.     She also mentioned that she has tried going off HRT in past and hot flashes return in ~ 2 weeks.  Gavi Negrete, MSN, RN, PHN -Nurse Clinician, [facility] [phone]

## 2024-02-16 ENCOUNTER — HOSPITAL ENCOUNTER (OUTPATIENT)
Dept: ULTRASOUND IMAGING | Facility: CLINIC | Age: 69
Discharge: HOME | End: 2024-02-16
Attending: PHYSICIAN ASSISTANT | Admitting: PHYSICIAN ASSISTANT
Payer: COMMERCIAL

## 2024-02-16 DIAGNOSIS — M79.661 PAIN OF RIGHT LOWER LEG: ICD-10-CM

## 2024-02-16 DIAGNOSIS — I26.99 PULMONARY EMBOLISM (H): ICD-10-CM

## 2024-02-16 PROCEDURE — 93971 EXTREMITY STUDY: CPT | Mod: RT

## 2024-02-21 NOTE — PROGRESS NOTES
"    [facility]  [address] [place] [id number]  Main: [phone], Fax: [phone]    Patient seen at: [facility] at [address] [place] [address]    Outpatient Visit Note:    Patient: Elizabeth Cuellar  MRN: [id number]  : 1955  CHLOE: 2024  Location of this writer at the time of this clinic visit was conducted: [facility] [place] for [facility] and Clot[facility] Disorders.  Location of the patient at the time of this clinic visit was conducted: [facility] for Bleeding and Clotting Disorders.     Reason for visit:  Pulmonary embolism found on 2024.     HPI:  Elizabeth is a 68 year old female with a history of hyperlipidemia and thyroid disease who was found to have small burden right upper lobe pulmonary emboli back on 2024, referred by Dr. Chad Trierweiler of [facility] for consultation.     Elizabeth and her  were in [place] and return to the [place] on 10/1/2023.     Then in early 2024, they did drive to [place] for a few days getaway, this was a 3 hours drive one way.     Elizabeth also reports that she has been on estrogen patch for many years for postmenopausal symptoms. She also very rarely use estrogen vaginal cream. She is still using estrogen patch as of today.     Dated back on 2024, she was seen by Dr. Eloina Newman of urology within the [facility] for consultation in regard to urinary incontinence. At the time, she reports an incident of complete loss of control of her bladder while on a trip recently to [place] and \"peed on the floor\". Ultimately, it was decided that she is to proceed with InterStim implant procedure.     On 2024, she had a pre-operative physical exam with her primary care provider for which she was complaining of some mild shortness of breath and chest pain as well as some pain over her right lower " calf for several weeks. For this reason, a D-Dimer test was performed and came back on 1/30/2024 as positive at 1.07 (normal < 0.50). Thus she was instructed to present to the emergency department for evaluation. CTA chest was done, which showed small burden right upper lobe pulmonary emboli. She was placed on rivaroxaban and is referred to our clinic for consultation.     2/16/2024, she had a right leg venous ultrasound done which showed no DVT.     Currently she is on rivaroxaban at 20 mg PO Qday dosing. She denies any bleeding issues. She is still using her estrogen patch and is resistant to discontinue this. Her symptoms of mild shortness of breath seems to have resolved.     ROS:  Denies any bleeding complications. Specifically, no frequent epistaxis. No issues with oral mucosal bleeding. Denies any hematuria or blood in stools. Denies any shortness of breath. No chest pain. No cough. No fever.    Medications:  Current Outpatient Medications   Medication    acetaminophen (TYLENOL) 325 MG tablet    albuterol (PROAIR HFA, PROVENTIL HFA, VENTOLIN HFA) 108 (90 BASE) MCG/ACT inhaler    Boswellia-Glucosamine-Vit D (GLUCOSAMINE COMPLEX PO)    cephALEXin (KEFLEX) 500 MG capsule    cyanocobalamin (CYANOCOBALAMIN) 1000 MCG/ML injection    diclofenac (VOLTAREN) 1 % topical gel    fluticasone (FLONASE) 50 MCG/ACT nasal spray    hydrOXYzine (ATARAX) 25 MG tablet    ibuprofen (ADVIL/MOTRIN) 400 MG tablet    levothyroxine (SYNTHROID/LEVOTHROID) 125 MCG tablet    loratadine (CLARITIN) 10 MG tablet    Misc Natural Products (GLUCOSAMINE CHOND COMPLEX/MSM) TABS    order for DME    oxybutynin ER (DITROPAN-XL) 5 MG 24 hr tablet    polyethylene glycol (MIRALAX) powder    Progesterone Micronized (PROMETRIUM PO)    Rivaroxaban ANTICOAGULANT 15 & 20 MG TBPK Starter Therapy Pack    rosuvastatin (CRESTOR) 5 MG tablet    senna-docusate (SENOKOT-S;PERICOLACE) 8.6-50 MG per tablet     No current facility-administered medications for this  "visit.     Allergies:  Allergies   Allergen Reactions    Other [Seasonal Allergies]      environmental    Penicillins Unknown     (HAD AS A CHILD)    Sulfa Antibiotics Hives     PmHx:  Past Medical History:   Diagnosis Date    Arthritis     left ankle    Hyperlipidemia     Overactive bladder     Plantar fasciitis     Post-infection bronchospasm     Postmenopausal     Rhinitis     Thyroid disease     Vitamin B12 deficiency        Social History:   Deferred.    Family History:  She reports no family history of DVT/PE.   Parents with no history of venous thromboembolism (VTE). Although her mother did have a history of varicose veins.   She has 4 sisters and 1 brother with no history of VTE.   She has no children of her own.     Objective:  Vitals: BP (!) 161/83   Pulse 86   Temp 98.3  F (36.8  C) (Oral)   Ht 1.638 m (5' 4.5\")   Wt 85.8 kg (189 lb 3.2 oz)   SpO2 96%   BMI 31.97 kg/m    Exam:   Complete exam is not performed today.       Labs:  Component      Latest Ref Rng 1/30/2024  1:19 PM   WBC      4.0 - 11.0 10e3/uL 7.4    RBC Count      3.80 - 5.20 10e6/uL 4.61    Hemoglobin      11.7 - 15.7 g/dL 14.1    Hematocrit      35.0 - 47.0 % 41.9    MCV      78 - 100 fL 91    MCH      26.5 - 33.0 pg 30.6    MCHC      31.5 - 36.5 g/dL 33.7    RDW      10.0 - 15.0 % 12.6    Platelet Count      150 - 450 10e3/uL 254    % Neutrophils      % 71    % Lymphocytes      % 19    % Monocytes      % 7    % Eosinophils      % 2    % Basophils      % 1    % Immature Granulocytes      % 0    NRBCs per 100 WBC      <1 /100 0    Absolute Neutrophils      1.6 - 8.3 10e3/uL 5.2    Absolute Lymphocytes      0.8 - 5.3 10e3/uL 1.4    Absolute Monocytes      0.0 - 1.3 10e3/uL 0.5    Absolute Eosinophils      0.0 - 0.7 10e3/uL 0.2    Absolute Basophils      0.0 - 0.2 10e3/uL 0.1    Absolute Immature Granulocytes      <=0.4 10e3/uL 0.0    Absolute NRBCs      10e3/uL 0.0    Sodium      135 - 145 mmol/L 136    Potassium      3.4 - 5.3 mmol/L " 6.6 (HH) (hemolyzed specimen)   Carbon Dioxide (CO2)      22 - 29 mmol/L 27    Anion Gap      7 - 15 mmol/L 7    Urea Nitrogen      8.0 - 23.0 mg/dL 12.5    Creatinine      0.51 - 0.95 mg/dL 0.71    GFR Estimate      >60 mL/min/1.73m2 >90    Calcium      8.8 - 10.2 mg/dL 9.0    Chloride      98 - 107 mmol/L 102    Glucose      70 - 99 mg/dL 95    Alkaline Phosphatase (Hemolyzed specimen)   AST (Hemolyzed specimen)   ALT (Hemolyzed specimen)   Protein Total (Hemolyzed specimen)   Albumin      3.5 - 5.2 g/dL 4.2    Bilirubin Total      <=1.2 mg/dL 0.4       Imaging:  Reviewed and are as described above.     Assessment:  In summary, Elizabeth is a 68 year old female with a history of hyperlipidemia and thyroid disease who was found to have small burden right upper lobe pulmonary emboli back on 1/30/2024, referred by Dr. Chad Trierweiler of [facility] for consultation.     Her pulmonary embolic event on 1/30/2024 was rather seemingly an incidental finding as she was rather asymptomatic from the event other than some mild shortness of breath. In fact, she told me that she almost did not mention this during her pre-op physical exam on 1/29/2024. Nevertheless, she did have an elevated D-Dimer and CTA did show a pulmonary embolism and thus this venous thromboembolic event was rather acute. Although her pulmonary emboli was small but it did involve the segmental pulmonary artery.  Her travel to [place] was almost 4 months prior to her diagnosis of this small pulmonary embolism and thus I do not belief that her travel to [place] has anything to do with this event. Furthermore, I also do not belief that the 3 hours car trip in early Jan 2024 to [facility] from [place] has anything to do with her pulmonary embolic event. It is also unclear how much of her using her estrogen patch has anything to do with this venous thromboembolic event. It is well documented that systemic estrogen use (such as estrogen OCP or  systemic estrogen replacement therapy) does increase the risk of thrombosis. However, transdermal estrogen patch or transvaginal estrogen cream has less risk for thrombosis but we still don't know how much of estrogen is being absorbed systemically. With all these considerations, I would categorize this pulmonary embolic event as minimally provoked at best.     Diagnosis:  Minimally provoked pulmonary embolism found on 1/30/2024.   Urinary incontinence. Surgery for InterStim implant is currently postponed.   Transdermal estrogen patch use and occasional transvaginal estrogen cream use for postmenopausal symptoms.     Plan:  I have a long discussion today with Elizabeth and her  today in regard to our plan and recommendation.     I spent quite a bit of time today to educate them on DVT/PE, provoked vs unprovoked venous thromboembolic events, and general approach in regard to anticoagulation therapy management and duration. I provided them with the above mentioned reasoning about my interpretation and categorization of her pulmonary embolic event found on 1/30/2024. I explain to them that it is unclear how much estrogen plays a role in her development of pulmonary embolism and it is impossible for me to determine that. I explain to her that it is conceivable that estrogen did play a role in this. I explain to her that in this particular case, it might be her ultimate decision about staying on indefinite anticoagulation therapy or discontinue therapy after the initial 3 months. As mentioned, Elizabeth is rather oppose to stop using her estrogen patch due to her fear of postmenopausal symptoms.     I explain to Elizabeth that she will need at least 3 months of uninterrupted anticoagulation therapy. At that point, we can re-group and discuss if she would like to stay on indefinite anticoagulation therapy vs stopping therapy at that point. If she decided to stay on indefinite anticoagulation therapy, we can consider decreasing  her rivaroxaban dose from 20 mg to 10 mg PO Qday after 6 months of full intensity therapy for ongoing secondary pharmacological DVT/PE prophylaxis.     I do recommend that she is to continue with rivaroxaban at 20 mg PO Qday. I have renewed her rivaroxaban prescription today. I will plan to see her back in May 2024 for follow up.    I do recommend that she postpone her InterStim implant surgery until at least after 4/30/2024.     Patient is instructed to call our clinic if she should experience any unusual bleeding complications or if she should need any invasive or surgical procedures in the future.         Jaxon Sifuentes PA-C, MPAS  Physician Assistant  [facility] for [facility] Clot[facility].     The longitudinal plan of care for these conditions below were addressed during this visit. Due to the added complexity in care, I will continue to support Elizabeth Cuellar  in the subsequent management of these conditions and with the ongoing continuity of care of these conditions.    Problem List Items Addressed This Visit as of 2/19/2024   Minimally provoked pulmonary embolism found on 1/30/2024.   Urinary incontinence. Surgery for InterStim implant is currently postponed.   Transdermal estrogen patch use and occasional transvaginal estrogen cream use for postmenopausal symptoms.    60 minutes spent by me on the date of the encounter doing chart review, history and exam, documentation and further activities per the note    Time IN: 14:10  Time OUT: 15:00

## 2024-02-29 ENCOUNTER — OFFICE VISIT (OUTPATIENT)
Dept: HEMATOLOGY | Facility: CLINIC | Age: 69
End: 2024-02-29
Attending: EMERGENCY MEDICINE
Payer: COMMERCIAL

## 2024-02-29 VITALS
BODY MASS INDEX: 31.52 KG/M2 | TEMPERATURE: 98.3 F | OXYGEN SATURATION: 96 % | WEIGHT: 189.2 LBS | HEART RATE: 86 BPM | SYSTOLIC BLOOD PRESSURE: 161 MMHG | HEIGHT: 65 IN | DIASTOLIC BLOOD PRESSURE: 83 MMHG

## 2024-02-29 DIAGNOSIS — Z86.711 HISTORY OF PULMONARY EMBOLISM: Primary | ICD-10-CM

## 2024-02-29 DIAGNOSIS — R32 URINARY INCONTINENCE, UNSPECIFIED TYPE: ICD-10-CM

## 2024-02-29 DIAGNOSIS — I26.99 ACUTE PULMONARY EMBOLISM WITHOUT ACUTE COR PULMONALE, UNSPECIFIED PULMONARY EMBOLISM TYPE (H): ICD-10-CM

## 2024-02-29 PROCEDURE — G2211 COMPLEX E/M VISIT ADD ON: HCPCS | Performed by: PHYSICIAN ASSISTANT

## 2024-02-29 PROCEDURE — G0463 HOSPITAL OUTPT CLINIC VISIT: HCPCS | Performed by: PHYSICIAN ASSISTANT

## 2024-02-29 PROCEDURE — 99205 OFFICE O/P NEW HI 60 MIN: CPT | Performed by: PHYSICIAN ASSISTANT

## 2024-02-29 RX ORDER — PROGESTERONE 100 MG/1
100 CAPSULE ORAL DAILY
COMMUNITY
Start: 2024-01-25

## 2024-02-29 RX ORDER — DESVENLAFAXINE 100 MG/1
100 TABLET, EXTENDED RELEASE ORAL DAILY
COMMUNITY

## 2024-02-29 RX ORDER — ESTRADIOL 0.03 MG/D
1 PATCH TRANSDERMAL
COMMUNITY

## 2024-02-29 RX ORDER — ESTRADIOL 0.1 MG/G
CREAM VAGINAL
COMMUNITY
Start: 2024-01-26

## 2024-02-29 NOTE — LETTER
"          [facility]  [address] [place] [address]  Main: [phone], Fax: [phone]    Patient seen at: [facility] at [address] [place] [address]    Outpatient Visit Note:    Patient: Elizabeth Cuellar  MRN: [id number]  : 1955  CHLOE: 2024  Location of this writer at the time of this clinic visit was conducted: [facility] for Bleeding and Clotting Disorders.  Location of the patient at the time of this clinic visit was conducted: [facility] for Bleeding and Clotting Disorders.     Reason for visit:  Pulmonary embolism found on 2024.     HPI:  Elizabeth is a 68 year old female with a history of hyperlipidemia and thyroid disease who was found to have small burden right upper lobe pulmonary emboli back on 2024, referred by Dr. Chad Trierweiler of [facility] for consultation.     Elizabeth and her  were in [place] and return [place] the [place] on [place]2023.     Then in early 2024, they did drive to [place] for a few days getaway, this was a 3 hours drive one way.     Elizabeth also reports that she has been on estrogen patch for many years for postmenopausal symptoms. She also very rarely use estrogen vaginal cream. She is still using estrogen patch as of today.     Dated back on 2024, she was seen by Dr. Eloina Newman of urology within the [facility] for consultation in regard to urinary incontinence. At the time, she reports an incident of complete loss of control of her bladder while on a trip recently to [place] and \"peed on the floor\". Ultimately, it was decided that she is to proceed with InterStim implant procedure.     On 2024, she had a pre-operative physical exam with her primary care provider for which she was complaining of some mild shortness of breath and chest pain as well as some pain over her right " lower calf for several weeks. For this reason, a D-Dimer test was performed and came back on 1/30/2024 as positive at 1.07 (normal < 0.50). Thus she was instructed to present to the emergency department for evaluation. CTA chest was done, which showed small burden right upper lobe pulmonary emboli. She was placed on rivaroxaban and is referred to our clinic for consultation.     2/16/2024, she had a right leg venous ultrasound done which showed no DVT.     Currently she is on rivaroxaban at 20 mg PO Qday dosing. She denies any bleeding issues. She is still using her estrogen patch and is resistant to discontinue this. Her symptoms of mild shortness of breath seems to have resolved.     ROS:  Denies any bleeding complications. Specifically, no frequent epistaxis. No issues with oral mucosal bleeding. Denies any hematuria or blood in stools. Denies any shortness of breath. No chest pain. No cough. No fever.    Medications:  Current Outpatient Medications   Medication     acetaminophen (TYLENOL) 325 MG tablet     albuterol (PROAIR HFA, PROVENTIL HFA, VENTOLIN HFA) 108 (90 BASE) MCG/ACT inhaler     Boswellia-Glucosamine-Vit D (GLUCOSAMINE COMPLEX PO)     cephALEXin (KEFLEX) 500 MG capsule     cyanocobalamin (CYANOCOBALAMIN) 1000 MCG/ML injection     diclofenac (VOLTAREN) 1 % topical gel     fluticasone (FLONASE) 50 MCG/ACT nasal spray     hydrOXYzine (ATARAX) 25 MG tablet     ibuprofen (ADVIL/MOTRIN) 400 MG tablet     levothyroxine (SYNTHROID/LEVOTHROID) 125 MCG tablet     loratadine (CLARITIN) 10 MG tablet     Misc Natural Products (GLUCOSAMINE CHOND COMPLEX/MSM) TABS     order for DME     oxybutynin ER (DITROPAN-XL) 5 MG 24 hr tablet     polyethylene glycol (MIRALAX) powder     Progesterone Micronized (PROMETRIUM PO)     Rivaroxaban ANTICOAGULANT 15 & 20 MG TBPK Starter Therapy Pack     rosuvastatin (CRESTOR) 5 MG tablet     senna-docusate (SENOKOT-S;PERICOLACE) 8.6-50 MG per tablet     No current  "facility-administered medications for this visit.     Allergies:  Allergies   Allergen Reactions     Other [Seasonal Allergies]      environmental     Penicillins Unknown     (HAD AS A CHILD)     Sulfa Antibiotics Hives     PmHx:  Past Medical History:   Diagnosis Date     Arthritis     left ankle     Hyperlipidemia      Overactive bladder      Plantar fasciitis      Post-infection bronchospasm      Postmenopausal      Rhinitis      Thyroid disease      Vitamin B12 deficiency        Social History:   Deferred.    Family History:  She reports no family history of DVT/PE.   Parents with no history of venous thromboembolism (VTE). Although her mother did have a history of varicose veins.   She has 4 sisters and 1 brother with no history of VTE.   She has no children of her own.     Objective:  Vitals: BP (!) 161/83   Pulse 86   Temp 98.3  F (36.8  C) (Oral)   Ht 1.638 m (5' 4.5\")   Wt 85.8 kg (189 lb 3.2 oz)   SpO2 96%   BMI 31.97 kg/m    Exam:   Complete exam is not performed today.       Labs:  Component      Latest Ref Rng 1/30/2024  1:19 PM   WBC      4.0 - 11.0 10e3/uL 7.4    RBC Count      3.80 - 5.20 10e6/uL 4.61    Hemoglobin      11.7 - 15.7 g/dL 14.1    Hematocrit      35.0 - 47.0 % 41.9    MCV      78 - 100 fL 91    MCH      26.5 - 33.0 pg 30.6    MCHC      31.5 - 36.5 g/dL 33.7    RDW      10.0 - 15.0 % 12.6    Platelet Count      150 - 450 10e3/uL 254    % Neutrophils      % 71    % Lymphocytes      % 19    % Monocytes      % 7    % Eosinophils      % 2    % Basophils      % 1    % Immature Granulocytes      % 0    NRBCs per 100 WBC      <1 /100 0    Absolute Neutrophils      1.6 - 8.3 10e3/uL 5.2    Absolute Lymphocytes      0.8 - 5.3 10e3/uL 1.4    Absolute Monocytes      0.0 - 1.3 10e3/uL 0.5    Absolute Eosinophils      0.0 - 0.7 10e3/uL 0.2    Absolute Basophils      0.0 - 0.2 10e3/uL 0.1    Absolute Immature Granulocytes      <=0.4 10e3/uL 0.0    Absolute NRBCs      10e3/uL 0.0    Sodium      " 135 - 145 mmol/L 136    Potassium      3.4 - 5.3 mmol/L 6.6 (HH) (hemolyzed specimen)   Carbon Dioxide (CO2)      22 - 29 mmol/L 27    Anion Gap      7 - 15 mmol/L 7    Urea Nitrogen      8.0 - 23.0 mg/dL 12.5    Creatinine      0.51 - 0.95 mg/dL 0.71    GFR Estimate      >60 mL/min/1.73m2 >90    Calcium      8.8 - 10.2 mg/dL 9.0    Chloride      98 - 107 mmol/L 102    Glucose      70 - 99 mg/dL 95    Alkaline Phosphatase (Hemolyzed specimen)   AST (Hemolyzed specimen)   ALT (Hemolyzed specimen)   Protein Total (Hemolyzed specimen)   Albumin      3.5 - 5.2 g/dL 4.2    Bilirubin Total      <=1.2 mg/dL 0.4       Imaging:  Reviewed and are as described above.     Assessment:  In summary, Elizabeth is a 68 year old female with a history of hyperlipidemia and thyroid disease who was found to have small burden right upper lobe pulmonary emboli back on 1/30/2024, referred by Dr. Chad Trierweiler of [facility] for consultation.     Her pulmonary embolic event on 1/30/2024 was rather seemingly an incidental finding as she was rather asymptomatic from the event other than some mild shortness of breath. In fact, she told me that she almost did not mention this during her pre-op physical exam on 1/29/2024. Nevertheless, she did have an elevated D-Dimer and CTA did show a pulmonary embolism and thus this venous thromboembolic event was rather acute. Although her pulmonary emboli was small but it did involve the segmental pulmonary artery.  Her travel to [place] was almost 4 months prior to her diagnosis of this small pulmonary embolism and thus I do not belief that her travel to [place] has anything to do with this event. Furthermore, I also do not belief that the 3 hours car trip in early Jan 2024 to [facility] from [place] has anything to do with her pulmonary embolic event. It is also unclear how much of her using her estrogen patch has anything to do with this venous thromboembolic event. It is well  documented that systemic estrogen use (such as estrogen OCP or systemic estrogen replacement therapy) does increase the risk of thrombosis. However, transdermal estrogen patch or transvaginal estrogen cream has less risk for thrombosis but we still don't know how much of estrogen is being absorbed systemically. With all these considerations, I would categorize this pulmonary embolic event as minimally provoked at best.     Diagnosis:  Minimally provoked pulmonary embolism found on 1/30/2024.   Urinary incontinence. Surgery for InterStim implant is currently postponed.   Transdermal estrogen patch use and occasional transvaginal estrogen cream use for postmenopausal symptoms.     Plan:  I have a long discussion today with Elizabeth and her  today in regard to our plan and recommendation.     I spent quite a bit of time today to educate them on DVT/PE, provoked vs unprovoked venous thromboembolic events, and general approach in regard to anticoagulation therapy management and duration. I provided them with the above mentioned reasoning about my interpretation and categorization of her pulmonary embolic event found on 1/30/2024. I explain to them that it is unclear how much estrogen plays a role in her development of pulmonary embolism and it is impossible for me to determine that. I explain to her that it is conceivable that estrogen did play a role in this. I explain to her that in this particular case, it might be her ultimate decision about staying on indefinite anticoagulation therapy or discontinue therapy after the initial 3 months. As mentioned, Elizabeth is rather oppose to stop using her estrogen patch due to her fear of postmenopausal symptoms.     I explain to Elizabeth that she will need at least 3 months of uninterrupted anticoagulation therapy. At that point, we can re-group and discuss if she would like to stay on indefinite anticoagulation therapy vs stopping therapy at that point. If she decided to stay on  indefinite anticoagulation therapy, we can consider decreasing her rivaroxaban dose from 20 mg to 10 mg PO Qday after 6 months of full intensity therapy for ongoing secondary pharmacological DVT/PE prophylaxis.     I do recommend that she is to continue with rivaroxaban at 20 mg PO Qday. I have renewed her rivaroxaban prescription today. I will plan to see her back in May 2024 for follow up.    I do recommend that she postpone her InterStim implant surgery until at least after 4/30/2024.     Patient is instructed to call our clinic if she should experience any unusual bleeding complications or if she should need any invasive or surgical procedures in the future.         Jaxon Sifuentes PA-C, MPAS  Physician Assistant  [facility] for Bleeding and Clotting Disorders.     The longitudinal plan of care for these conditions below were addressed during this visit. Due to the added complexity in care, I will continue to support Elizabeth Cuellar  in the subsequent management of these conditions and with the ongoing continuity of care of these conditions.    Problem List Items Addressed This Visit as of 2/19/2024   Minimally provoked pulmonary embolism found on 1/30/2024.   Urinary incontinence. Surgery for InterStim implant is currently postponed.   Transdermal estrogen patch use and occasional transvaginal estrogen cream use for postmenopausal symptoms.    60 minutes spent by me on the date of the encounter doing chart review, history and exam, documentation and further activities per the note    Time IN: 14:10  Time OUT: 15:00

## 2024-03-25 ENCOUNTER — TELEPHONE (OUTPATIENT)
Dept: HEMATOLOGY | Facility: CLINIC | Age: 69
End: 2024-03-25
Payer: COMMERCIAL

## 2024-03-25 NOTE — TELEPHONE ENCOUNTER
[id number]  Elizabeth Cuellar  68 year old female  [facility] Diagnosis: Diagnosis:  Minimally provoked pulmonary embolism found on 1/30/2024.   Urinary incontinence. Surgery for InterStim implant is currently postponed.   Transdermal estrogen patch use and occasional transvaginal estrogen cream use for postmenopausal symptoms.   [facility] Provider: CAROLYN Storey     reached out to patient to schedule follow up in May. Patient was resistant to scheduling Left message with patient encouraging to schedule follow up visit at that time.  If she does not schedule visit, she will need to get any additional blood thinner from another provider. I told her that Jaxon was scheduling into May currently. Will wait for patent to call back.   Gavi Negrete, MSN, RN, PHN -Nurse Clinician, [facility] for Bleeding & Clotting Disorders [phone]     Addendum: Patient called today 3/26/24. She said she planned to follow up with her PCP for her ongoing plan and her prescriptions.  Gavi Negrete MSN, RN, PHN -Nurse Clinician, [facility] for [facility] Clot[facility] Disorders [phone]

## 2024-06-16 ENCOUNTER — HEALTH MAINTENANCE LETTER (OUTPATIENT)
Age: 69
End: 2024-06-16

## 2024-09-18 ENCOUNTER — MEDICAL CORRESPONDENCE (OUTPATIENT)
Dept: HEALTH INFORMATION MANAGEMENT | Facility: CLINIC | Age: 69
End: 2024-09-18
Payer: COMMERCIAL

## 2024-09-19 DIAGNOSIS — Z01.818 PREOPERATIVE EXAMINATION: ICD-10-CM

## 2024-09-19 DIAGNOSIS — R94.31 ABNORMAL ELECTROCARDIOGRAM: Primary | ICD-10-CM

## 2024-09-25 ENCOUNTER — HOSPITAL ENCOUNTER (OUTPATIENT)
Dept: CARDIOLOGY | Facility: CLINIC | Age: 69
Discharge: HOME | End: 2024-09-25
Attending: FAMILY MEDICINE | Admitting: FAMILY MEDICINE
Payer: COMMERCIAL

## 2024-09-25 DIAGNOSIS — Z01.818 PREOPERATIVE EXAMINATION: ICD-10-CM

## 2024-09-25 DIAGNOSIS — R94.31 ABNORMAL ELECTROCARDIOGRAM: ICD-10-CM

## 2024-09-25 LAB — LVEF ECHO: NORMAL

## 2024-09-25 PROCEDURE — 93306 TTE W/DOPPLER COMPLETE: CPT | Mod: 26 | Performed by: INTERNAL MEDICINE

## 2024-09-25 PROCEDURE — 93306 TTE W/DOPPLER COMPLETE: CPT

## 2024-12-04 ENCOUNTER — HOSPITAL ENCOUNTER (OUTPATIENT)
Dept: MAMMOGRAPHY | Facility: CLINIC | Age: 69
Discharge: HOME | End: 2024-12-04
Attending: FAMILY MEDICINE
Payer: COMMERCIAL

## 2024-12-04 DIAGNOSIS — Z12.31 SCREENING MAMMOGRAM FOR BREAST CANCER: ICD-10-CM

## 2024-12-04 PROCEDURE — 77063 BREAST TOMOSYNTHESIS BI: CPT

## 2024-12-04 PROCEDURE — 77067 SCR MAMMO BI INCL CAD: CPT

## 2025-06-21 ENCOUNTER — HEALTH MAINTENANCE LETTER (OUTPATIENT)
Age: 70
End: 2025-06-21

## 2025-07-16 ENCOUNTER — TRANSFERRED RECORDS (OUTPATIENT)
Dept: HEALTH INFORMATION MANAGEMENT | Facility: CLINIC | Age: 70
End: 2025-07-16
Payer: COMMERCIAL

## (undated) DEVICE — GLOVE PROTEXIS BLUE W/NEU-THERA 7.0  2D73EB70

## (undated) DEVICE — LINEN TOWEL PACK X5 5464

## (undated) DEVICE — SEAL SET MYOSURE ROD LENS SCOPE SINGLE USE 40-902

## (undated) DEVICE — KIT PROCEDURE FLUENT IN/OUT FLOWPAK TISS TRAP FLT-112S

## (undated) DEVICE — PACK TVT HYSTEROSCOPY SMA15HYFSE

## (undated) DEVICE — Device

## (undated) DEVICE — GLOVE PROTEXIS MICRO 7.0  2D73PM70

## (undated) RX ORDER — PROPOFOL 10 MG/ML
INJECTION, EMULSION INTRAVENOUS
Status: DISPENSED
Start: 2020-05-28

## (undated) RX ORDER — DEXAMETHASONE SODIUM PHOSPHATE 4 MG/ML
INJECTION, SOLUTION INTRA-ARTICULAR; INTRALESIONAL; INTRAMUSCULAR; INTRAVENOUS; SOFT TISSUE
Status: DISPENSED
Start: 2020-05-28

## (undated) RX ORDER — FENTANYL CITRATE 50 UG/ML
INJECTION, SOLUTION INTRAMUSCULAR; INTRAVENOUS
Status: DISPENSED
Start: 2020-05-28

## (undated) RX ORDER — ACETAMINOPHEN 325 MG/1
TABLET ORAL
Status: DISPENSED
Start: 2020-05-28

## (undated) RX ORDER — LIDOCAINE HYDROCHLORIDE 20 MG/ML
INJECTION, SOLUTION EPIDURAL; INFILTRATION; INTRACAUDAL; PERINEURAL
Status: DISPENSED
Start: 2020-05-28

## (undated) RX ORDER — NEOSTIGMINE METHYLSULFATE 1 MG/ML
VIAL (ML) INJECTION
Status: DISPENSED
Start: 2020-05-28

## (undated) RX ORDER — ONDANSETRON 2 MG/ML
INJECTION INTRAMUSCULAR; INTRAVENOUS
Status: DISPENSED
Start: 2020-05-28